# Patient Record
Sex: MALE | Race: BLACK OR AFRICAN AMERICAN | Employment: UNEMPLOYED | URBAN - METROPOLITAN AREA
[De-identification: names, ages, dates, MRNs, and addresses within clinical notes are randomized per-mention and may not be internally consistent; named-entity substitution may affect disease eponyms.]

---

## 2018-10-16 ENCOUNTER — APPOINTMENT (OUTPATIENT)
Dept: GENERAL RADIOLOGY | Age: 83
End: 2018-10-16
Payer: MEDICARE

## 2018-10-16 ENCOUNTER — HOSPITAL ENCOUNTER (EMERGENCY)
Age: 83
Discharge: HOME | End: 2018-10-17
Payer: MEDICARE

## 2018-10-16 VITALS
DIASTOLIC BLOOD PRESSURE: 116 MMHG | WEIGHT: 157.7 LBS | HEIGHT: 72 IN | BODY MASS INDEX: 21.36 KG/M2 | TEMPERATURE: 98.3 F | RESPIRATION RATE: 16 BRPM | SYSTOLIC BLOOD PRESSURE: 181 MMHG | OXYGEN SATURATION: 96 % | HEART RATE: 96 BPM

## 2018-10-16 DIAGNOSIS — S92.421A CLOSED FRACTURE OF DISTAL PHALANX OF RIGHT GREAT TOE, PHYSEAL INVOLVEMENT UNSPECIFIED, INITIAL ENCOUNTER: ICD-10-CM

## 2018-10-16 DIAGNOSIS — S91.111A LACERATION OF GREAT TOE OF RIGHT FOOT, FOREIGN BODY PRESENCE UNSPECIFIED, NAIL DAMAGE STATUS UNSPECIFIED, INITIAL ENCOUNTER: Primary | ICD-10-CM

## 2018-10-16 PROCEDURE — 6360000002 HC RX W HCPCS: Performed by: PHYSICIAN ASSISTANT

## 2018-10-16 PROCEDURE — 99282 EMERGENCY DEPT VISIT SF MDM: CPT

## 2018-10-16 PROCEDURE — 2500000003 HC RX 250 WO HCPCS: Performed by: PHYSICIAN ASSISTANT

## 2018-10-16 PROCEDURE — 90471 IMMUNIZATION ADMIN: CPT | Performed by: PHYSICIAN ASSISTANT

## 2018-10-16 PROCEDURE — 12001 RPR S/N/AX/GEN/TRNK 2.5CM/<: CPT

## 2018-10-16 PROCEDURE — 73630 X-RAY EXAM OF FOOT: CPT

## 2018-10-16 PROCEDURE — 6370000000 HC RX 637 (ALT 250 FOR IP): Performed by: PHYSICIAN ASSISTANT

## 2018-10-16 PROCEDURE — 90715 TDAP VACCINE 7 YRS/> IM: CPT | Performed by: PHYSICIAN ASSISTANT

## 2018-10-16 RX ORDER — LIDOCAINE HYDROCHLORIDE 10 MG/ML
5 INJECTION, SOLUTION INFILTRATION; PERINEURAL ONCE
Status: COMPLETED | OUTPATIENT
Start: 2018-10-16 | End: 2018-10-16

## 2018-10-16 RX ORDER — DIAPER,BRIEF,INFANT-TODD,DISP
EACH MISCELLANEOUS ONCE
Status: COMPLETED | OUTPATIENT
Start: 2018-10-16 | End: 2018-10-16

## 2018-10-16 RX ADMIN — TETANUS TOXOID, REDUCED DIPHTHERIA TOXOID AND ACELLULAR PERTUSSIS VACCINE, ADSORBED 0.5 ML: 5; 2.5; 8; 8; 2.5 SUSPENSION INTRAMUSCULAR at 22:15

## 2018-10-16 RX ADMIN — Medication: at 23:03

## 2018-10-16 RX ADMIN — LIDOCAINE HYDROCHLORIDE 5 ML: 10 INJECTION, SOLUTION INFILTRATION; PERINEURAL at 23:31

## 2018-10-16 ASSESSMENT — PAIN SCALES - GENERAL: PAINLEVEL_OUTOF10: 3

## 2018-10-17 RX ORDER — CEPHALEXIN 500 MG/1
500 CAPSULE ORAL 4 TIMES DAILY
Qty: 40 CAPSULE | Refills: 0 | Status: SHIPPED | OUTPATIENT
Start: 2018-10-17 | End: 2018-10-27

## 2018-10-17 NOTE — ED PROVIDER NOTES
orthopedic resident Dr. Celso Clinton will evaluate patient here in the Er.   recommendation for Keflex following up with Orthopedics in 2 weeks     Medical Decision Making:    Patient  discharged to home in a postop keep wound clean and dry he was discharged with Keflex is to  Orthopedics in 2 weeks. Counseling: The emergency provider has spoken with the patient and discussed todays results, in addition to providing specific details for the plan of care and counseling regarding the diagnosis and prognosis. Questions are answered at this time and they are agreeable with the plan.      --------------------------------- IMPRESSION AND DISPOSITION ---------------------------------    IMPRESSION  1. Laceration of great toe of right foot, foreign body presence unspecified, nail damage status unspecified, initial encounter    2. Closed fracture of distal phalanx of right great toe, physeal involvement unspecified, initial encounter        DISPOSITION  Disposition: Discharge to home  Patient condition is good      NOTE: This report was transcribed using voice recognition software.  Every effort was made to ensure accuracy; however, inadvertent computerized transcription errors may be present          Linnette Pina, [id number]  10/17/18 0031

## 2018-10-17 NOTE — CONSULTS
Transverse, nondisplaced fracture through proximal aspect of distal great toe phalanx. Appear not to be intraarticular. IMPRESSION:  · Left closed distal phalanx fracture of great toe  · Laceration of L great toe    PLAN:  · Distal laceration cleaned and probed. Unable to appreciate communication to bone. · Lacerations covered with xeroform, sterile dressings and hue tapped to 2nd toe.   · Pt placed in hard soled shoe  · WBAT to LLE  · Pain per ED  · OK to follow up with Dr. Alissa Healy in clinic

## 2018-11-03 ENCOUNTER — HOSPITAL ENCOUNTER (INPATIENT)
Age: 83
LOS: 6 days | Discharge: NURSING, REHAB OR LONG-TERM CARE | DRG: 065 | End: 2018-11-09
Attending: EMERGENCY MEDICINE | Admitting: INTERNAL MEDICINE
Payer: MEDICARE

## 2018-11-03 ENCOUNTER — APPOINTMENT (OUTPATIENT)
Dept: GENERAL RADIOLOGY | Age: 83
DRG: 065 | End: 2018-11-03
Payer: MEDICARE

## 2018-11-03 ENCOUNTER — APPOINTMENT (OUTPATIENT)
Dept: CT IMAGING | Age: 83
DRG: 065 | End: 2018-11-03
Payer: MEDICARE

## 2018-11-03 DIAGNOSIS — N17.9 AKI (ACUTE KIDNEY INJURY) (HCC): ICD-10-CM

## 2018-11-03 DIAGNOSIS — I62.9 INTRACRANIAL BLEED (HCC): Primary | ICD-10-CM

## 2018-11-03 DIAGNOSIS — I61.9 HEMORRHAGIC STROKE (HCC): ICD-10-CM

## 2018-11-03 DIAGNOSIS — T79.6XXA TRAUMATIC RHABDOMYOLYSIS, INITIAL ENCOUNTER (HCC): ICD-10-CM

## 2018-11-03 DIAGNOSIS — I10 HYPERTENSION, UNSPECIFIED TYPE: ICD-10-CM

## 2018-11-03 DIAGNOSIS — J18.9 COMMUNITY ACQUIRED PNEUMONIA OF LEFT LUNG, UNSPECIFIED PART OF LUNG: ICD-10-CM

## 2018-11-03 PROBLEM — M62.82 RHABDOMYOLYSIS: Status: ACTIVE | Noted: 2018-11-03

## 2018-11-03 PROBLEM — R33.9 URINARY RETENTION: Status: ACTIVE | Noted: 2018-11-03

## 2018-11-03 LAB
ABO/RH: NORMAL
ALBUMIN SERPL-MCNC: 3.6 G/DL (ref 3.5–5.2)
ALP BLD-CCNC: 79 U/L (ref 40–129)
ALT SERPL-CCNC: 35 U/L (ref 0–40)
AMORPHOUS: ABNORMAL
ANION GAP SERPL CALCULATED.3IONS-SCNC: 15 MMOL/L (ref 7–16)
ANISOCYTOSIS: ABNORMAL
ANTIBODY SCREEN: NORMAL
APTT: 26.9 SEC (ref 24.5–35.1)
AST SERPL-CCNC: 126 U/L (ref 0–39)
BACTERIA: ABNORMAL /HPF
BASOPHILS ABSOLUTE: 0.02 E9/L (ref 0–0.2)
BASOPHILS RELATIVE PERCENT: 0.1 % (ref 0–2)
BILIRUB SERPL-MCNC: 1.7 MG/DL (ref 0–1.2)
BILIRUBIN URINE: NEGATIVE
BLOOD, URINE: ABNORMAL
BUN BLDV-MCNC: 38 MG/DL (ref 8–23)
BURR CELLS: ABNORMAL
CALCIUM SERPL-MCNC: 10.7 MG/DL (ref 8.6–10.2)
CHLORIDE BLD-SCNC: 107 MMOL/L (ref 98–107)
CHLORIDE URINE RANDOM: 44 MMOL/L
CLARITY: CLEAR
CO2: 20 MMOL/L (ref 22–29)
COLOR: YELLOW
CREAT SERPL-MCNC: 2 MG/DL (ref 0.7–1.2)
CREATININE URINE: 76 MG/DL (ref 40–278)
EKG ATRIAL RATE: 92 BPM
EKG P AXIS: 73 DEGREES
EKG P-R INTERVAL: 172 MS
EKG Q-T INTERVAL: 382 MS
EKG QRS DURATION: 88 MS
EKG QTC CALCULATION (BAZETT): 472 MS
EKG R AXIS: 51 DEGREES
EKG T AXIS: 57 DEGREES
EKG VENTRICULAR RATE: 92 BPM
EOSINOPHILS ABSOLUTE: 0 E9/L (ref 0.05–0.5)
EOSINOPHILS RELATIVE PERCENT: 0 % (ref 0–6)
GFR AFRICAN AMERICAN: 38
GFR NON-AFRICAN AMERICAN: 38 ML/MIN/1.73
GLUCOSE BLD-MCNC: 147 MG/DL (ref 74–109)
GLUCOSE URINE: NEGATIVE MG/DL
HCT VFR BLD CALC: 46.2 % (ref 37–54)
HEMOGLOBIN: 14.6 G/DL (ref 12.5–16.5)
IMMATURE GRANULOCYTES #: 0.14 E9/L
IMMATURE GRANULOCYTES %: 0.8 % (ref 0–5)
INR BLD: 1.1
KETONES, URINE: NEGATIVE MG/DL
LACTIC ACID: 2.5 MMOL/L (ref 0.5–2.2)
LEUKOCYTE ESTERASE, URINE: NEGATIVE
LYMPHOCYTES ABSOLUTE: 0.59 E9/L (ref 1.5–4)
LYMPHOCYTES RELATIVE PERCENT: 3.5 % (ref 20–42)
MCH RBC QN AUTO: 27.4 PG (ref 26–35)
MCHC RBC AUTO-ENTMCNC: 31.6 % (ref 32–34.5)
MCV RBC AUTO: 86.8 FL (ref 80–99.9)
MONOCYTES ABSOLUTE: 2.19 E9/L (ref 0.1–0.95)
MONOCYTES RELATIVE PERCENT: 13.1 % (ref 2–12)
NEUTROPHILS ABSOLUTE: 13.74 E9/L (ref 1.8–7.3)
NEUTROPHILS RELATIVE PERCENT: 82.5 % (ref 43–80)
NITRITE, URINE: NEGATIVE
OSMOLALITY URINE: 519 MOSM/KG (ref 300–900)
OVALOCYTES: ABNORMAL
PDW BLD-RTO: 15 FL (ref 11.5–15)
PH UA: 6 (ref 5–9)
PLATELET # BLD: 116 E9/L (ref 130–450)
PMV BLD AUTO: 13.6 FL (ref 7–12)
POIKILOCYTES: ABNORMAL
POLYCHROMASIA: ABNORMAL
POTASSIUM SERPL-SCNC: 4.1 MMOL/L (ref 3.5–5)
POTASSIUM, UR: 30.5 MMOL/L
PROTEIN UA: 30 MG/DL
PROTHROMBIN TIME: 12.6 SEC (ref 9.3–12.4)
RBC # BLD: 5.32 E12/L (ref 3.8–5.8)
RBC UA: ABNORMAL /HPF (ref 0–2)
SODIUM BLD-SCNC: 142 MMOL/L (ref 132–146)
SODIUM URINE: 47 MMOL/L
SPECIFIC GRAVITY UA: 1.02 (ref 1–1.03)
TARGET CELLS: ABNORMAL
TOTAL CK: 2401 U/L (ref 20–200)
TOTAL CK: 4138 U/L (ref 20–200)
TOTAL PROTEIN: 7.5 G/DL (ref 6.4–8.3)
TROPONIN: 0.02 NG/ML (ref 0–0.03)
TSH SERPL DL<=0.05 MIU/L-ACNC: 1.95 UIU/ML (ref 0.27–4.2)
UROBILINOGEN, URINE: 0.2 E.U./DL
WBC # BLD: 16.7 E9/L (ref 4.5–11.5)
WBC UA: ABNORMAL /HPF (ref 0–5)

## 2018-11-03 PROCEDURE — 99285 EMERGENCY DEPT VISIT HI MDM: CPT

## 2018-11-03 PROCEDURE — 93005 ELECTROCARDIOGRAM TRACING: CPT | Performed by: EMERGENCY MEDICINE

## 2018-11-03 PROCEDURE — 96365 THER/PROPH/DIAG IV INF INIT: CPT

## 2018-11-03 PROCEDURE — 83935 ASSAY OF URINE OSMOLALITY: CPT

## 2018-11-03 PROCEDURE — 02HV33Z INSERTION OF INFUSION DEVICE INTO SUPERIOR VENA CAVA, PERCUTANEOUS APPROACH: ICD-10-PCS | Performed by: INTERNAL MEDICINE

## 2018-11-03 PROCEDURE — 6360000002 HC RX W HCPCS: Performed by: EMERGENCY MEDICINE

## 2018-11-03 PROCEDURE — 85025 COMPLETE CBC W/AUTO DIFF WBC: CPT

## 2018-11-03 PROCEDURE — 80053 COMPREHEN METABOLIC PANEL: CPT

## 2018-11-03 PROCEDURE — 2500000003 HC RX 250 WO HCPCS: Performed by: INTERNAL MEDICINE

## 2018-11-03 PROCEDURE — 84300 ASSAY OF URINE SODIUM: CPT

## 2018-11-03 PROCEDURE — 2580000003 HC RX 258: Performed by: EMERGENCY MEDICINE

## 2018-11-03 PROCEDURE — 87088 URINE BACTERIA CULTURE: CPT

## 2018-11-03 PROCEDURE — 86850 RBC ANTIBODY SCREEN: CPT

## 2018-11-03 PROCEDURE — 82570 ASSAY OF URINE CREATININE: CPT

## 2018-11-03 PROCEDURE — 87081 CULTURE SCREEN ONLY: CPT

## 2018-11-03 PROCEDURE — 36556 INSERT NON-TUNNEL CV CATH: CPT | Performed by: SURGERY

## 2018-11-03 PROCEDURE — 86901 BLOOD TYPING SEROLOGIC RH(D): CPT

## 2018-11-03 PROCEDURE — APPSS180 APP SPLIT SHARED TIME > 60 MINUTES: Performed by: NURSE PRACTITIONER

## 2018-11-03 PROCEDURE — 36415 COLL VENOUS BLD VENIPUNCTURE: CPT

## 2018-11-03 PROCEDURE — 6360000002 HC RX W HCPCS: Performed by: INTERNAL MEDICINE

## 2018-11-03 PROCEDURE — 70450 CT HEAD/BRAIN W/O DYE: CPT

## 2018-11-03 PROCEDURE — 72131 CT LUMBAR SPINE W/O DYE: CPT

## 2018-11-03 PROCEDURE — 87205 SMEAR GRAM STAIN: CPT

## 2018-11-03 PROCEDURE — 83605 ASSAY OF LACTIC ACID: CPT

## 2018-11-03 PROCEDURE — 74176 CT ABD & PELVIS W/O CONTRAST: CPT

## 2018-11-03 PROCEDURE — 84133 ASSAY OF URINE POTASSIUM: CPT

## 2018-11-03 PROCEDURE — 85610 PROTHROMBIN TIME: CPT

## 2018-11-03 PROCEDURE — 99223 1ST HOSP IP/OBS HIGH 75: CPT | Performed by: SURGERY

## 2018-11-03 PROCEDURE — 72170 X-RAY EXAM OF PELVIS: CPT

## 2018-11-03 PROCEDURE — 72125 CT NECK SPINE W/O DYE: CPT

## 2018-11-03 PROCEDURE — 71045 X-RAY EXAM CHEST 1 VIEW: CPT

## 2018-11-03 PROCEDURE — 2580000003 HC RX 258: Performed by: INTERNAL MEDICINE

## 2018-11-03 PROCEDURE — 71250 CT THORAX DX C-: CPT

## 2018-11-03 PROCEDURE — 81001 URINALYSIS AUTO W/SCOPE: CPT

## 2018-11-03 PROCEDURE — 96375 TX/PRO/DX INJ NEW DRUG ADDON: CPT

## 2018-11-03 PROCEDURE — 2000000000 HC ICU R&B

## 2018-11-03 PROCEDURE — 84443 ASSAY THYROID STIM HORMONE: CPT

## 2018-11-03 PROCEDURE — 86900 BLOOD TYPING SEROLOGIC ABO: CPT

## 2018-11-03 PROCEDURE — 87040 BLOOD CULTURE FOR BACTERIA: CPT

## 2018-11-03 PROCEDURE — 84484 ASSAY OF TROPONIN QUANT: CPT

## 2018-11-03 PROCEDURE — 82550 ASSAY OF CK (CPK): CPT

## 2018-11-03 PROCEDURE — 85730 THROMBOPLASTIN TIME PARTIAL: CPT

## 2018-11-03 PROCEDURE — 82436 ASSAY OF URINE CHLORIDE: CPT

## 2018-11-03 PROCEDURE — 2500000003 HC RX 250 WO HCPCS: Performed by: EMERGENCY MEDICINE

## 2018-11-03 PROCEDURE — 51702 INSERT TEMP BLADDER CATH: CPT

## 2018-11-03 PROCEDURE — 72128 CT CHEST SPINE W/O DYE: CPT

## 2018-11-03 RX ORDER — SODIUM CHLORIDE 0.9 % (FLUSH) 0.9 %
10 SYRINGE (ML) INJECTION EVERY 12 HOURS SCHEDULED
Status: DISCONTINUED | OUTPATIENT
Start: 2018-11-03 | End: 2018-11-09 | Stop reason: HOSPADM

## 2018-11-03 RX ORDER — LABETALOL HYDROCHLORIDE 5 MG/ML
10 INJECTION, SOLUTION INTRAVENOUS EVERY 10 MIN PRN
Status: DISCONTINUED | OUTPATIENT
Start: 2018-11-03 | End: 2018-11-05

## 2018-11-03 RX ORDER — SODIUM CHLORIDE 9 MG/ML
INJECTION, SOLUTION INTRAVENOUS CONTINUOUS
Status: DISCONTINUED | OUTPATIENT
Start: 2018-11-03 | End: 2018-11-03

## 2018-11-03 RX ORDER — LOVASTATIN 40 MG/1
40 TABLET ORAL NIGHTLY
COMMUNITY

## 2018-11-03 RX ORDER — SODIUM CHLORIDE 0.9 % (FLUSH) 0.9 %
10 SYRINGE (ML) INJECTION PRN
Status: DISCONTINUED | OUTPATIENT
Start: 2018-11-03 | End: 2018-11-09 | Stop reason: HOSPADM

## 2018-11-03 RX ORDER — 0.9 % SODIUM CHLORIDE 0.9 %
1000 INTRAVENOUS SOLUTION INTRAVENOUS ONCE
Status: COMPLETED | OUTPATIENT
Start: 2018-11-03 | End: 2018-11-03

## 2018-11-03 RX ORDER — ACETAMINOPHEN 325 MG/1
650 TABLET ORAL EVERY 4 HOURS PRN
Status: DISCONTINUED | OUTPATIENT
Start: 2018-11-03 | End: 2018-11-09 | Stop reason: HOSPADM

## 2018-11-03 RX ORDER — LISINOPRIL 20 MG/1
20 TABLET ORAL DAILY
COMMUNITY

## 2018-11-03 RX ORDER — ACETAMINOPHEN 325 MG/1
650 TABLET ORAL EVERY 4 HOURS PRN
Status: DISCONTINUED | OUTPATIENT
Start: 2018-11-03 | End: 2018-11-03 | Stop reason: SDUPTHER

## 2018-11-03 RX ORDER — DEXAMETHASONE SODIUM PHOSPHATE 10 MG/ML
10 INJECTION, SOLUTION INTRAMUSCULAR; INTRAVENOUS ONCE
Status: COMPLETED | OUTPATIENT
Start: 2018-11-03 | End: 2018-11-03

## 2018-11-03 RX ORDER — LORAZEPAM 2 MG/ML
0.5 INJECTION INTRAMUSCULAR ONCE
Status: COMPLETED | OUTPATIENT
Start: 2018-11-03 | End: 2018-11-03

## 2018-11-03 RX ORDER — DONEPEZIL HYDROCHLORIDE 10 MG/1
10 TABLET, FILM COATED ORAL NIGHTLY
COMMUNITY

## 2018-11-03 RX ORDER — HYDRALAZINE HYDROCHLORIDE 20 MG/ML
10 INJECTION INTRAMUSCULAR; INTRAVENOUS EVERY 10 MIN PRN
Status: DISCONTINUED | OUTPATIENT
Start: 2018-11-03 | End: 2018-11-05

## 2018-11-03 RX ORDER — MORPHINE SULFATE 2 MG/ML
2 INJECTION, SOLUTION INTRAMUSCULAR; INTRAVENOUS
Status: DISCONTINUED | OUTPATIENT
Start: 2018-11-03 | End: 2018-11-05

## 2018-11-03 RX ORDER — HYDRALAZINE HYDROCHLORIDE 20 MG/ML
10 INJECTION INTRAMUSCULAR; INTRAVENOUS ONCE
Status: COMPLETED | OUTPATIENT
Start: 2018-11-03 | End: 2018-11-03

## 2018-11-03 RX ORDER — ACETAMINOPHEN 650 MG/1
650 SUPPOSITORY RECTAL EVERY 4 HOURS PRN
Status: DISCONTINUED | OUTPATIENT
Start: 2018-11-03 | End: 2018-11-09 | Stop reason: HOSPADM

## 2018-11-03 RX ORDER — 0.9 % SODIUM CHLORIDE 0.9 %
500 INTRAVENOUS SOLUTION INTRAVENOUS ONCE
Status: COMPLETED | OUTPATIENT
Start: 2018-11-03 | End: 2018-11-03

## 2018-11-03 RX ORDER — TAMSULOSIN HYDROCHLORIDE 0.4 MG/1
0.4 CAPSULE ORAL NIGHTLY
COMMUNITY

## 2018-11-03 RX ORDER — ONDANSETRON 2 MG/ML
4 INJECTION INTRAMUSCULAR; INTRAVENOUS EVERY 6 HOURS PRN
Status: DISCONTINUED | OUTPATIENT
Start: 2018-11-03 | End: 2018-11-09 | Stop reason: HOSPADM

## 2018-11-03 RX ORDER — MORPHINE SULFATE 2 MG/ML
1 INJECTION, SOLUTION INTRAMUSCULAR; INTRAVENOUS
Status: DISCONTINUED | OUTPATIENT
Start: 2018-11-03 | End: 2018-11-05

## 2018-11-03 RX ORDER — LEVETIRACETAM 10 MG/ML
1000 INJECTION INTRAVASCULAR ONCE
Status: COMPLETED | OUTPATIENT
Start: 2018-11-03 | End: 2018-11-03

## 2018-11-03 RX ORDER — ERGOCALCIFEROL (VITAMIN D2) 1250 MCG
50000 CAPSULE ORAL WEEKLY
Status: ON HOLD | COMMUNITY
End: 2018-11-09 | Stop reason: HOSPADM

## 2018-11-03 RX ADMIN — DEXAMETHASONE SODIUM PHOSPHATE 10 MG: 10 INJECTION INTRAMUSCULAR; INTRAVENOUS at 13:39

## 2018-11-03 RX ADMIN — LEVETIRACETAM 250 MG: 100 INJECTION, SOLUTION INTRAVENOUS at 20:38

## 2018-11-03 RX ADMIN — LEVETIRACETAM 1000 MG: 10 INJECTION, SOLUTION INTRAVENOUS at 14:08

## 2018-11-03 RX ADMIN — SODIUM BICARBONATE: 84 INJECTION, SOLUTION INTRAVENOUS at 19:59

## 2018-11-03 RX ADMIN — DEXTROSE MONOHYDRATE 1 G: 5 INJECTION, SOLUTION INTRAVENOUS at 15:09

## 2018-11-03 RX ADMIN — SODIUM CHLORIDE 500 ML: 9 INJECTION, SOLUTION INTRAVENOUS at 15:10

## 2018-11-03 RX ADMIN — LORAZEPAM 0.5 MG: 2 INJECTION INTRAMUSCULAR; INTRAVENOUS at 13:24

## 2018-11-03 RX ADMIN — DOXYCYCLINE 100 MG: 100 INJECTION, POWDER, LYOPHILIZED, FOR SOLUTION INTRAVENOUS at 16:02

## 2018-11-03 RX ADMIN — Medication 10 ML: at 20:10

## 2018-11-03 RX ADMIN — SODIUM CHLORIDE 1000 ML: 9 INJECTION, SOLUTION INTRAVENOUS at 12:22

## 2018-11-03 RX ADMIN — AZITHROMYCIN MONOHYDRATE 500 MG: 500 INJECTION, POWDER, LYOPHILIZED, FOR SOLUTION INTRAVENOUS at 18:45

## 2018-11-03 RX ADMIN — HYDRALAZINE HYDROCHLORIDE 10 MG: 20 INJECTION INTRAMUSCULAR; INTRAVENOUS at 12:22

## 2018-11-03 NOTE — ED PROVIDER NOTES
Neurological: He is alert. He exhibits normal muscle tone. Alert but oriented to self only; able to follow commands; difficult to understand but when I do understand, he is not making sense   Skin: Skin is warm and dry. No rash noted. He is not diaphoretic. No erythema. No pallor. Procedures    MDM  Number of Diagnoses or Management Options  DANNIE (acute kidney injury) [facility]):   Community acquired pneumonia of left lung, unspecified part of lung:   Hypertension, unspecified type: Intracranial bleed [facility]):   Traumatic rhabdomyolysis, initial encounter [facility]):   Diagnosis management comments: Labs and imaging ordered and reviewed. He seemed to be in rhabdomyolysis with elevated CK and slight kidney injury. CT scan of his head showing intracranial bleed. He was found by the steps and this was traumatic, so trauma was consult. He was hypertensive but did improve with a couple doses of hydralazine. His chest x-ray showing possible pneumonia at the left lower lung base. He was hypothermic and had leukocytosis so he was given Rocephin and doxycycline to cover for community acquired infection. He was given some IV fluids. A Burgos catheter was placed. He was given a gram of Keppra because there was evidence of subarachnoid bleed. He was also given Decadron as there was some edema around his central vertical bleed on the left. He is not on any anticoagulation. ED Course as of Nov 03 1527   Sat Nov 03, 2018   1216 Bedside ultrasound showing distended bladder. Burgos catheter was placed with ease and there is clear to dark yellow urine and return. Labs are being drawn. Orders have been initiated. [EM]   1325 Blood pressures now 151/84. We will give him another dose of hydralazine. CT head showing either intraparenchymal bleed on the left with edema versus mass with edema. No midline shift appreciated. Son updated.  He was told that this is very serious but he was adamant that he would be okay with surgery if needed Medications   cefTRIAXone (ROCEPHIN) 1 g in dextrose 5 % 50 mL IVPB (vial-mate) (1 g Intravenous New Bag 11/3/18 1509)   doxycycline (VIBRAMYCIN) 100 mg in dextrose 5 % 100 mL IVPB (not administered)   0.9 % sodium chloride bolus (500 mLs Intravenous New Bag 11/3/18 1510)   0.9 % sodium chloride bolus (0 mLs Intravenous Stopped 11/3/18 1442)   hydrALAZINE (APRESOLINE) injection 10 mg (10 mg Intravenous Given 11/3/18 1222)   LORazepam (ATIVAN) injection 0.5 mg (0.5 mg Intravenous Given 11/3/18 1324)   dexamethasone (PF) (DECADRON) injection 10 mg (10 mg Intravenous Given 11/3/18 1339)   levetiracetam (KEPPRA) 1000 mg/100 mL IVPB (0 mg Intravenous Stopped 11/3/18 1442)       CONSULTATIONS:            1330 - Discussed case with [facility] Dr. Aly Dickinson who advised to call trauma. 1520 -  Discussed case with Dr. Jude Shafer who agreed to accept admission. COUNSELING:   I have spoken with the son and patient and discussed todays results, in addition to providing specific details for the plan of care and counseling regarding the diagnosis and prognosis.     --------------------------------------- IMPRESSION & DISPOSITION --------------------------------     IMPRESSION(s):  1. Intracranial bleed ([facility] [address]    2. Community acquired pneumonia of left lung, unspecified part of lung    3. Traumatic rhabdomyolysis, initial encounter ([facility] [address]    4. DANNIE (acute kidney injury) ([facility] [address]    5. Hypertension, unspecified type        This patient's ED course included: a personal history and physicial examination, re-evaluation prior to disposition, IV medications, cardiac monitoring and continuous pulse oximetry    This patient has remained hemodynamically stable during their ED course. DISPOSITION:  Disposition: Admit to NICU. Patient condition is critical.    END OF PROVIDER NOTE.            Juliet Reeves DO  Resident  11/03/18 7411

## 2018-11-03 NOTE — ED NOTES
Bed: 05  Expected date:   Expected time:   Means of arrival:   Comments:  [facility]     Cami Shaw RN  11/03/18 1149

## 2018-11-03 NOTE — PROGRESS NOTES
History     Social History    Marital status:      Spouse name: N/A    Number of children: N/A    Years of education: N/A     Occupational History    Not on file. Social History Main Topics    Smoking status: Never Smoker    Smokeless tobacco: Never Used    Alcohol use No    Drug use: No    Sexual activity: Not on file     Other Topics Concern    Not on file     Social History Narrative    No narrative on file     No past surgical history on file. Patient has no known allergies. Data:   Scheduled Meds:   sodium chloride flush  10 mL Intravenous 2 times per day    levetiracetam  250 mg Intravenous Q12H    [START ON 11/4/2018] cefTRIAXone (ROCEPHIN) IV  1 g Intravenous Q24H    azithromycin  500 mg Intravenous Q24H     Continuous Infusions:   sodium chloride       PRN Meds:sodium chloride flush, magnesium hydroxide, ondansetron, morphine **OR** morphine, acetaminophen, acetaminophen, hydrALAZINE, labetalol  I/O last 3 completed shifts: In: 1100 [IV Piggyback:1100]  Out: -   I/O this shift:  In: -   Out: 1400 [Urine:1400]    Intake/Output Summary (Last 24 hours) at 11/03/18 1833  Last data filed at 11/03/18 1610   Gross per 24 hour   Intake             1100 ml   Output             1400 ml   Net             -300 ml     CBC:   Recent Labs      11/03/18   1220   WBC  16.7*   HGB  14.6   PLT  116*     BMP:    Recent Labs      11/03/18   1220   NA  142   K  4.1   CL  107   CO2  20*   BUN  38*   CREATININE  2.0*   GLUCOSE  147*     Hepatic:   Recent Labs      11/03/18   1220   AST  126*   ALT  35   BILITOT  1.7*   ALKPHOS  79     Protein/ Albumin:    Lab Results   Component Value Date    LABALBU 3.6 11/03/2018      Ref.  Range 11/3/2018 12:20   Color, UA Latest Ref Range: Straw/Yellow  Yellow   Clarity, UA Latest Ref Range: Clear  Clear   Glucose, UA Latest Ref Range: Negative mg/dL Negative   Bilirubin, Urine Latest Ref Range: Negative  Negative   Ketones, Urine Latest Ref Range: Negative mg/dL Negative   Specific Gravity, UA Latest Ref Range: 1.005 - 1.030  1.020   Blood, Urine Latest Ref Range: Negative  LARGE (A)   pH, UA Latest Ref Range: 5.0 - 9.0  6.0   Protein, UA Latest Ref Range: Negative mg/dL 30 (A)   Urobilinogen, Urine Latest Ref Range: <2.0 E.U./dL 0.2   Nitrite, Urine Latest Ref Range: Negative  Negative   Leukocyte Esterase, Urine Latest Ref Range: Negative  Negative   WBC, UA Latest Ref Range: 0 - 5 /HPF 1-3   RBC, UA Latest Ref Range: 0 - 2 /HPF 5-10 (A)   Bacteria, UA Latest Units: /HPF FEW (A)   Amorphous, UA Unknown FEW      Ref. Range 11/3/2018 12:20   Total CK Latest Ref Range: 20 - 200 U/L 4,138 (H)        Imaging Results         Procedure Component Value Ref Range Date/Time     CT ABDOMEN PELVIS WO CONTRAST Additional Contrast? None [[id number]] Resulted: 18 1639     Order Status: Completed Updated: 18 164     Narrative:       Patient MRN:  [id number]  : 3/29/1931  Age: 80 years  Gender: Male  Order Date:  11/3/2018 2:30 PM  EXAM: CT ABDOMEN PELVIS WO CONTRAST  NUMBER OF IMAGES \ views:  115  INDICATION:  Trauma   COMPARISON: None    TECHNIQUE: Axial computerized tomography sections of the abdomen and  pelvis with sagittal and coronal MPR reconstructions were obtained  from the top of the diaphragm to the pelvis. Low-dose CT  acquisition technique included one of following options;  1 . Automated exposure control, 2. Adjustment of MA and or KV  according to patient's size or 3. Use of iterative reconstruction. FINDINGS:  Evaluation of the solid organs and vasculature are limited without the  use of intravenous contrast. Evaluation of the gastrointestinal tract  is limited without the use of oral contrast.  THORACIC BASE: Please refer to the report for the CT of the chest that  was performed concurrently with this exam.  LIVER: Unremarkable. BILIARY: The gallbladder is present. PANCREAS: Unremarkable. SPLEEN: Unremarkable.   ADRENALS:  Unremarkable. KIDNEYS:  A large exophytic cyst is seen arising from the upper pole  of the right kidney. No hydronephrosis is seen. A Burgos catheter is  present within the decompressed bladder. The prostate is enlarged,  measuring 5.1 cm transversely. GI: No evidence of free air or bowel obstruction. The appendix is not  visualized. Scattered colonic diverticuli are seen without evidence of  diverticulitis. PELVIS: Unremarkable. MSK: No acute osseous findings. OTHER: None.     Impression:         1. No acute traumatic intra-abdominal findings within the given  confines of this noncontrast exam.  2. Enlarged prostate. 3. Colonic diverticulosis without evidence of diverticulitis.             CT Lumbar Spine WO Contrast [[id number]] Resulted: 18     Order Status: Completed Updated: 18     Narrative:       Patient MRN:  [id number]  : 3/29/1931  Age: 80 years  Gender: Male  Order Date:  11/3/2018 2:30 PM  EXAM: CT THORACIC SPINE WO CONTRAST, CT LUMBAR SPINE WO CONTRAST  NUMBER OF IMAGES:  [id number]  INDICATION:  trauma  , pain  COMPARISON: None    TECHNIQUE: Axial CT of the thoracic and lumbar spine was obtained. Sagittal and coronal MPR reconstructions were performed and uploaded  to PACS for evaluation. Technique: Low-dose CT  acquisition technique included one of  following options; 1 . Automated exposure control, 2. Adjustment of MA  and or KV according to patient's size or 3. Use of iterative  reconstruction. FINDINGS:  No acute fracture or dislocation is seen. No significant degenerative changes are seen. Alignment of the  thoracic and lumbar spine is anatomic. Significant loss of intervertebral disc height is seen at L2-L3 and  L5-S1.  Severe facet joint arthropathy seen throughout the lumbar  spine.     Impression:       No acute osseous findings.     CT Thoracic Spine WO Contrast [[id number]] Resulted: 18     Order Status: Completed Updated: 18     Narrative:

## 2018-11-04 ENCOUNTER — APPOINTMENT (OUTPATIENT)
Dept: CT IMAGING | Age: 83
DRG: 065 | End: 2018-11-04
Payer: MEDICARE

## 2018-11-04 ENCOUNTER — APPOINTMENT (OUTPATIENT)
Dept: GENERAL RADIOLOGY | Age: 83
DRG: 065 | End: 2018-11-04
Payer: MEDICARE

## 2018-11-04 LAB
ALBUMIN SERPL-MCNC: 3.2 G/DL (ref 3.5–5.2)
ALP BLD-CCNC: 69 U/L (ref 40–129)
ALT SERPL-CCNC: 29 U/L (ref 0–40)
ANION GAP SERPL CALCULATED.3IONS-SCNC: 9 MMOL/L (ref 7–16)
ANISOCYTOSIS: ABNORMAL
AST SERPL-CCNC: 79 U/L (ref 0–39)
BASOPHILS ABSOLUTE: 0.01 E9/L (ref 0–0.2)
BASOPHILS RELATIVE PERCENT: 0.1 % (ref 0–2)
BILIRUB SERPL-MCNC: 1.1 MG/DL (ref 0–1.2)
BUN BLDV-MCNC: 24 MG/DL (ref 8–23)
CALCIUM IONIZED: 1.45 MMOL/L (ref 1.15–1.33)
CALCIUM SERPL-MCNC: 9.7 MG/DL (ref 8.6–10.2)
CHLORIDE BLD-SCNC: 109 MMOL/L (ref 98–107)
CO2: 27 MMOL/L (ref 22–29)
CREAT SERPL-MCNC: 1.1 MG/DL (ref 0.7–1.2)
EOSINOPHIL, URINE: 0 % (ref 0–1)
EOSINOPHILS ABSOLUTE: 0 E9/L (ref 0.05–0.5)
EOSINOPHILS RELATIVE PERCENT: 0 % (ref 0–6)
GFR AFRICAN AMERICAN: >60
GFR NON-AFRICAN AMERICAN: >60 ML/MIN/1.73
GLUCOSE BLD-MCNC: 151 MG/DL (ref 74–109)
HCT VFR BLD CALC: 41.9 % (ref 37–54)
HEMOGLOBIN: 13.4 G/DL (ref 12.5–16.5)
IMMATURE GRANULOCYTES #: 0.08 E9/L
IMMATURE GRANULOCYTES %: 0.7 % (ref 0–5)
LACTIC ACID: 0.9 MMOL/L (ref 0.5–2.2)
LYMPHOCYTES ABSOLUTE: 0.73 E9/L (ref 1.5–4)
LYMPHOCYTES RELATIVE PERCENT: 6.2 % (ref 20–42)
MAGNESIUM: 2.2 MG/DL (ref 1.6–2.6)
MCH RBC QN AUTO: 28 PG (ref 26–35)
MCHC RBC AUTO-ENTMCNC: 32 % (ref 32–34.5)
MCV RBC AUTO: 87.7 FL (ref 80–99.9)
MONOCYTES ABSOLUTE: 1.61 E9/L (ref 0.1–0.95)
MONOCYTES RELATIVE PERCENT: 13.7 % (ref 2–12)
NEUTROPHILS ABSOLUTE: 9.34 E9/L (ref 1.8–7.3)
NEUTROPHILS RELATIVE PERCENT: 79.3 % (ref 43–80)
PARATHYROID HORMONE INTACT: 185 PG/ML (ref 15–65)
PDW BLD-RTO: 15.2 FL (ref 11.5–15)
PHOSPHORUS: 2.2 MG/DL (ref 2.5–4.5)
PLATELET # BLD: 112 E9/L (ref 130–450)
PMV BLD AUTO: 13.2 FL (ref 7–12)
POTASSIUM SERPL-SCNC: 3.9 MMOL/L (ref 3.5–5)
RBC # BLD: 4.78 E12/L (ref 3.8–5.8)
SODIUM BLD-SCNC: 145 MMOL/L (ref 132–146)
TOTAL CK: 1342 U/L (ref 20–200)
TOTAL CK: 1589 U/L (ref 20–200)
TOTAL PROTEIN: 6.5 G/DL (ref 6.4–8.3)
WBC # BLD: 11.8 E9/L (ref 4.5–11.5)

## 2018-11-04 PROCEDURE — 99221 1ST HOSP IP/OBS SF/LOW 40: CPT | Performed by: EMERGENCY MEDICINE

## 2018-11-04 PROCEDURE — 82330 ASSAY OF CALCIUM: CPT

## 2018-11-04 PROCEDURE — 85025 COMPLETE CBC W/AUTO DIFF WBC: CPT

## 2018-11-04 PROCEDURE — 84100 ASSAY OF PHOSPHORUS: CPT

## 2018-11-04 PROCEDURE — 84165 PROTEIN E-PHORESIS SERUM: CPT

## 2018-11-04 PROCEDURE — 80053 COMPREHEN METABOLIC PANEL: CPT

## 2018-11-04 PROCEDURE — 2500000003 HC RX 250 WO HCPCS: Performed by: INTERNAL MEDICINE

## 2018-11-04 PROCEDURE — 6360000002 HC RX W HCPCS: Performed by: INTERNAL MEDICINE

## 2018-11-04 PROCEDURE — 2500000003 HC RX 250 WO HCPCS: Performed by: NURSE PRACTITIONER

## 2018-11-04 PROCEDURE — 83735 ASSAY OF MAGNESIUM: CPT

## 2018-11-04 PROCEDURE — 99291 CRITICAL CARE FIRST HOUR: CPT | Performed by: SURGERY

## 2018-11-04 PROCEDURE — 82550 ASSAY OF CK (CPK): CPT

## 2018-11-04 PROCEDURE — 36592 COLLECT BLOOD FROM PICC: CPT

## 2018-11-04 PROCEDURE — S0028 INJECTION, FAMOTIDINE, 20 MG: HCPCS | Performed by: INTERNAL MEDICINE

## 2018-11-04 PROCEDURE — 74018 RADEX ABDOMEN 1 VIEW: CPT

## 2018-11-04 PROCEDURE — 2580000003 HC RX 258: Performed by: INTERNAL MEDICINE

## 2018-11-04 PROCEDURE — 83970 ASSAY OF PARATHORMONE: CPT

## 2018-11-04 PROCEDURE — 70450 CT HEAD/BRAIN W/O DYE: CPT

## 2018-11-04 PROCEDURE — C1889 IMPLANT/INSERT DEVICE, NOC: HCPCS

## 2018-11-04 PROCEDURE — 83605 ASSAY OF LACTIC ACID: CPT

## 2018-11-04 PROCEDURE — 36415 COLL VENOUS BLD VENIPUNCTURE: CPT

## 2018-11-04 PROCEDURE — 2000000000 HC ICU R&B

## 2018-11-04 RX ORDER — LEVETIRACETAM 5 MG/ML
250 INJECTION INTRAVASCULAR EVERY 12 HOURS
Status: DISCONTINUED | OUTPATIENT
Start: 2018-11-04 | End: 2018-11-05

## 2018-11-04 RX ORDER — DEXTROSE AND SODIUM CHLORIDE 5; .45 G/100ML; G/100ML
INJECTION, SOLUTION INTRAVENOUS CONTINUOUS
Status: DISCONTINUED | OUTPATIENT
Start: 2018-11-04 | End: 2018-11-09 | Stop reason: HOSPADM

## 2018-11-04 RX ADMIN — LEVETIRACETAM 250 MG: 5 INJECTION INTRAVENOUS at 09:21

## 2018-11-04 RX ADMIN — LABETALOL HYDROCHLORIDE 10 MG: 5 INJECTION, SOLUTION INTRAVENOUS at 21:41

## 2018-11-04 RX ADMIN — AZITHROMYCIN MONOHYDRATE 500 MG: 500 INJECTION, POWDER, LYOPHILIZED, FOR SOLUTION INTRAVENOUS at 17:58

## 2018-11-04 RX ADMIN — DEXTROSE AND SODIUM CHLORIDE: 5; 450 INJECTION, SOLUTION INTRAVENOUS at 15:45

## 2018-11-04 RX ADMIN — Medication 10 ML: at 21:40

## 2018-11-04 RX ADMIN — CEFTRIAXONE SODIUM 1 G: 1 INJECTION, POWDER, FOR SOLUTION INTRAMUSCULAR; INTRAVENOUS at 15:51

## 2018-11-04 RX ADMIN — LEVETIRACETAM 250 MG: 5 INJECTION INTRAVENOUS at 20:30

## 2018-11-04 RX ADMIN — FAMOTIDINE 20 MG: 10 INJECTION, SOLUTION INTRAVENOUS at 21:40

## 2018-11-04 RX ADMIN — MORPHINE SULFATE 2 MG: 2 INJECTION, SOLUTION INTRAMUSCULAR; INTRAVENOUS at 21:41

## 2018-11-04 RX ADMIN — POTASSIUM PHOSPHATE, MONOBASIC AND POTASSIUM PHOSPHATE, DIBASIC 15 MMOL: 224; 236 INJECTION, SOLUTION INTRAVENOUS at 15:46

## 2018-11-04 RX ADMIN — Medication 10 ML: at 20:30

## 2018-11-04 RX ADMIN — SODIUM BICARBONATE: 84 INJECTION, SOLUTION INTRAVENOUS at 10:36

## 2018-11-04 RX ADMIN — SODIUM BICARBONATE: 84 INJECTION, SOLUTION INTRAVENOUS at 01:52

## 2018-11-04 RX ADMIN — Medication 10 ML: at 09:21

## 2018-11-04 NOTE — CONSULTS
floor nurse. Current Medications:  Inpatient medications reviewed: yes  Home Medications reviewed: yes    Subjective:   Hospital days prior to  consult: Hospital Day: 2    Subjective/Events  This 80-year-old -American male was found down by his son at home. The patient's son stated that the patient has underlying dementia and that he does check on him frequently. Nursing reported that the son was upset last evening because he was unable to check on him for a few days prior to his fall. The patient is unable to give any history at this time due to his altered mental status. He is on no anticoagulation. History was obtained from the patient's current medical record and nursing, due to the patient's altered mental status. On discussing code status with the son, the son stated that he would like the patient to be a limited code with no resuscitation if a cardiopulmonary arrest occurs. Goals of care: continue current management and to be determined  Advance Directives: limited code- no CPR, No Intubation, No Medications, No Cardioversion  Surrogate:Spouse  Prognosis: unknown  Spiritual assessment: No spiritual distress identified   Bereavement and grief: to be determined    No past medical history on file. No past surgical history on file. No family history on file. Unable to obtain familyhistory due to altered mental status    No Known Allergies    Review of Systems:   See palliative care ROS/ESAS below; Detail ROS unable to obtain due to patient's mental status    Social history:  Marital status:   Living status: alone with son assisting him.     Family Meeting:  Participants:Son [facility])  Family meeting was held to discuss:diagnosis, prognosis, treatment options, goals of care, prior expressed wishes, advancedcare planning and symptom management      Objective:     Physical Exam  BP (!) 164/75   Pulse 83   Temp 98.5 °F (36.9 °C) (Temporal)   Resp 13   Ht 6' (1.829 m)   Wt 159 lb 3.2 eating well, 10= not eating) 10   Wellbeing Score  (10= worst sense of well-being) 8   Constipation    10   DyspneaScore    (0= no shortness of breath) 3     Assessed by: provider. FLACC Scale (For Pain Assessment of the Non-Verbal Patient)  Face: 0- no particular expression  Legs: 0- normal position or relaxed  Activity: 0-lying quietly, moves easily  Cry: 0-no cry  Consolability:0-content, relaxed  Total Score: 0    Results/Verification of Data Review   Objective data reviewed: labs, images, records, medicationuse, vitals and chart    Data in Support of Terminal Illness:N/A Palliative Patient. Electronically signed by Chelly Hinson DO on 11/4/2018 at 10:23 AM    Thank you for allowing Palliative Medicine to participate in the care of Erik Lawrence. Note: This reportwas completed using computerize voiced recognition software. Every effort has been made to ensure accuracy; however, inadvertent computerized transcription errors may be present.

## 2018-11-04 NOTE — PROGRESS NOTES
Neuro Science Intensive Care Unit  Critical Care  Daily Progress Note 11/4/2018    Date of Admission: 11/3/18  CC:  [facility], [facility]. HOSPITAL EVENTS  11/3/18 Admitted with hemorrhagic stroke  11/4/18 Code status updated to Limited. OVERNIGHT EVENTS: T max 98.4 F. Require additional doses of antihypertensive agents in the previous 24 hours. PHYSICAL EXAM:     General appearance:  Comfortable.         NEUROLOGIC:    GCS:    3 - Opens eyes to loud noise or command   4 - Moves part of body but does not remove noxious stimulus  2 - Moans, makes unintelligible sounds            Pupil size:      Left 3 mm       Right 3 mm  Pupil reaction: Yes   PERRLA  Wiggles fingers: Left No Right No  Hand grasp:   Left No    Right No  Wiggles toes: Left No    Right No  Plantar flexion: Left No  Right No        CONSTITUTIONAL: No acute distress, resting in  bed. Large cephalohematoma mid forehead. Cervical collar on  CARDIOVASCULAR: S1 S2, regular rate, regular rhythm, Monitor:  SR  PULMONARY:    Respirations unlabored. No rhonchi/rales/wheezes. RENAL:   . Burgos to gravity, clear yellow urine. ABDOMEN: Soft, nontender, nondistended, nontympanic, normal bowel sounds. MUSCULOSKELETAL: Random movement on the right side. Minimal movement on the left  SKIN: No rashes/ecchymosis, , warm/dry, good capillary refill. IV Access:  Peripheral IVs.      ASSESSMENT & PLAN   Active Problems:    Intracranial hemorrhage (HCC)    Rhabdomyolysis    DANNIE (acute kidney injury) ([facility] [address]    Urinary retention  Resolved Problems:    * No resolved hospital problems. *        · Neuro:  left temporal IPH, bilateral SAH parietal frontal lobes, and interventricular blood, status post fall or hemorrhagic stroke. Neurosurgery  following. Monitor neuro status. Keppra  · CV: Hypertension-improving. SBP goal <170 mm Hg.  when necessary labetalol and hydralazine  · Pulm: No acute issues.  Monitor respiratory status  · GI: Nothing by mouth due to decreased

## 2018-11-04 NOTE — PLAN OF CARE
Problem: Risk for Impaired Skin Integrity  Goal: Tissue integrity - skin and mucous membranes  Structural intactness and normal physiological function of skin and  mucous membranes.    Outcome: Not Met This Shift      Problem: Falls - Risk of:  Goal: Will remain free from falls  Will remain free from falls   Outcome: Met This Shift    Goal: Absence of physical injury  Absence of physical injury   Outcome: Not Met This Shift      Problem: Aspiration:  Goal: Absence of aspiration  Absence of aspiration  Outcome: Met This Shift      Problem: Mental Status - Impaired:  Goal: Mental status will be restored to baseline  Mental status will be restored to baseline  Outcome: Not Met This Shift      Problem: Pain:  Goal: Pain level will decrease  Pain level will decrease  Outcome: Met This Shift    Goal: Recognizes and communicates pain  Recognizes and communicates pain  Outcome: Not Met This Shift    Goal: Control of acute pain  Control of acute pain  Outcome: Met This Shift    Goal: Control of chronic pain  Control of chronic pain  Outcome: Met This Shift

## 2018-11-04 NOTE — PROCEDURES
Luz Pierce is a 80 y.o. male patient. 1. Intracranial bleed ([facility] [address]    2. Community acquired pneumonia of left lung, unspecified part of lung    3. [facility]    4. DANNIE (acute kidney injury) ([facility]    5. Hypertension, unspecified type      No past medical history on file. Blood pressure (!) 149/77, pulse 100, temperature 98.2 °F (36.8 °C), temperature source Temporal, resp. rate 15, height 6' (1.829 m), weight 149 lb 9.6 oz (67.9 kg), SpO2 100 %. Central Line  Date/Time: 11/3/2018 11:23 PM  Performed by: Iris Daniels  Authorized by: Iris Daniels   Consent: Verbal consent not obtained. Written consent obtained. Risks and benefits: risks, benefits and alternatives were discussed  Consent given by: guardian  Patient understanding: patient states understanding of the procedure being performed  Patient consent: the patient's understanding of the procedure matches consent given  Procedure consent: procedure consent matches procedure scheduled  Required items: required blood products, implants, devices, and special equipment available  Patient identity confirmed: verbally with patient and arm band  Time out: Immediately prior to procedure a \"time out\" was called to verify the correct patient, procedure, equipment, support staff and site/side marked as required.   Indications: vascular access  Anesthesia: local infiltration    Anesthesia:  Local Anesthetic: lidocaine 1% without epinephrine  Anesthetic total: 5 mL    Sedation:  Patient sedated: no  Preparation: skin prepped with ChloraPrep  Sterile barriers: all five maximum sterile barriers used - cap, mask, sterile gown, sterile gloves, and large sterile sheet  Hand hygiene: hand hygiene performed prior to central venous catheter insertion  Location details: right internal jugular  Patient position: flat  Catheter type: triple lumen  Catheter size: 7 Fr  Pre-procedure: landmarks identified  Ultrasound guidance: yes  Sterile ultrasound techniques: sterile gel and sterile probe covers were used  Number of attempts: 2  Successful placement: yes  Post-procedure: line sutured and dressing applied  Assessment: blood return through all ports,  free fluid flow and placement verified by x-ray  Patient tolerance: Patient tolerated the procedure well with no immediate complications        Kayla Davila DO  11/3/2018      [facility]   Attending Physician Statement:  Late entry:  I was present during the entire procedure and was actively supervising and directing the resident. There were no complications.     Seun White MD, FACS  11/4/2018  5:54 AM

## 2018-11-05 LAB
ALBUMIN SERPL-MCNC: 2.9 G/DL (ref 3.5–4.7)
ALBUMIN SERPL-MCNC: 2.9 G/DL (ref 3.5–5.2)
ALP BLD-CCNC: 63 U/L (ref 40–129)
ALPHA-1-GLOBULIN: 0.3 G/DL (ref 0.2–0.4)
ALPHA-2-GLOBULIN: 0.7 G/FL (ref 0.5–1)
ALT SERPL-CCNC: 24 U/L (ref 0–40)
ANION GAP SERPL CALCULATED.3IONS-SCNC: 9 MMOL/L (ref 7–16)
ANISOCYTOSIS: ABNORMAL
AST SERPL-CCNC: 48 U/L (ref 0–39)
BASOPHILS ABSOLUTE: 0.03 E9/L (ref 0–0.2)
BASOPHILS RELATIVE PERCENT: 0.4 % (ref 0–2)
BETA GLOBULIN: 1 G/DL (ref 0.8–1.3)
BILIRUB SERPL-MCNC: 1.4 MG/DL (ref 0–1.2)
BUN BLDV-MCNC: 13 MG/DL (ref 8–23)
CALCIUM IONIZED: 1.4 MMOL/L (ref 1.15–1.33)
CALCIUM SERPL-MCNC: 8.7 MG/DL (ref 8.6–10.2)
CHLORIDE BLD-SCNC: 108 MMOL/L (ref 98–107)
CO2: 25 MMOL/L (ref 22–29)
CREAT SERPL-MCNC: 0.9 MG/DL (ref 0.7–1.2)
ELECTROPHORESIS: ABNORMAL
EOSINOPHILS ABSOLUTE: 0.03 E9/L (ref 0.05–0.5)
EOSINOPHILS RELATIVE PERCENT: 0.4 % (ref 0–6)
GAMMA GLOBULIN: 1.3 G/DL (ref 0.7–1.6)
GFR AFRICAN AMERICAN: >60
GFR NON-AFRICAN AMERICAN: >60 ML/MIN/1.73
GLUCOSE BLD-MCNC: 132 MG/DL (ref 74–99)
HCT VFR BLD CALC: 39.4 % (ref 37–54)
HEMOGLOBIN: 12.2 G/DL (ref 12.5–16.5)
IMMATURE GRANULOCYTES #: 0.03 E9/L
IMMATURE GRANULOCYTES %: 0.4 % (ref 0–5)
LYMPHOCYTES ABSOLUTE: 1.04 E9/L (ref 1.5–4)
LYMPHOCYTES RELATIVE PERCENT: 12.4 % (ref 20–42)
MAGNESIUM: 2 MG/DL (ref 1.6–2.6)
MCH RBC QN AUTO: 27.7 PG (ref 26–35)
MCHC RBC AUTO-ENTMCNC: 31 % (ref 32–34.5)
MCV RBC AUTO: 89.3 FL (ref 80–99.9)
MONOCYTES ABSOLUTE: 1.25 E9/L (ref 0.1–0.95)
MONOCYTES RELATIVE PERCENT: 14.9 % (ref 2–12)
MRSA CULTURE ONLY: NORMAL
NEUTROPHILS ABSOLUTE: 6.02 E9/L (ref 1.8–7.3)
NEUTROPHILS RELATIVE PERCENT: 71.5 % (ref 43–80)
PDW BLD-RTO: 15 FL (ref 11.5–15)
PHOSPHORUS: 2 MG/DL (ref 2.5–4.5)
PLATELET # BLD: 82 E9/L (ref 130–450)
PLATELET CONFIRMATION: NORMAL
PMV BLD AUTO: 12.2 FL (ref 7–12)
POTASSIUM SERPL-SCNC: 3.8 MMOL/L (ref 3.5–5)
RBC # BLD: 4.41 E12/L (ref 3.8–5.8)
SODIUM BLD-SCNC: 142 MMOL/L (ref 132–146)
TOTAL CK: 585 U/L (ref 20–200)
TOTAL PROTEIN: 5.9 G/DL (ref 6.4–8.3)
TRIGL SERPL-MCNC: 72 MG/DL (ref 0–149)
WBC # BLD: 8.4 E9/L (ref 4.5–11.5)

## 2018-11-05 PROCEDURE — G8981 BODY POS CURRENT STATUS: HCPCS

## 2018-11-05 PROCEDURE — S0028 INJECTION, FAMOTIDINE, 20 MG: HCPCS | Performed by: INTERNAL MEDICINE

## 2018-11-05 PROCEDURE — 2580000003 HC RX 258: Performed by: NURSE PRACTITIONER

## 2018-11-05 PROCEDURE — G8987 SELF CARE CURRENT STATUS: HCPCS

## 2018-11-05 PROCEDURE — 2580000003 HC RX 258: Performed by: INTERNAL MEDICINE

## 2018-11-05 PROCEDURE — 36592 COLLECT BLOOD FROM PICC: CPT

## 2018-11-05 PROCEDURE — 6360000002 HC RX W HCPCS: Performed by: INTERNAL MEDICINE

## 2018-11-05 PROCEDURE — G8988 SELF CARE GOAL STATUS: HCPCS

## 2018-11-05 PROCEDURE — 6370000000 HC RX 637 (ALT 250 FOR IP): Performed by: NURSE PRACTITIONER

## 2018-11-05 PROCEDURE — 83735 ASSAY OF MAGNESIUM: CPT

## 2018-11-05 PROCEDURE — 97166 OT EVAL MOD COMPLEX 45 MIN: CPT

## 2018-11-05 PROCEDURE — 97530 THERAPEUTIC ACTIVITIES: CPT

## 2018-11-05 PROCEDURE — 36415 COLL VENOUS BLD VENIPUNCTURE: CPT

## 2018-11-05 PROCEDURE — 2500000003 HC RX 250 WO HCPCS: Performed by: NURSE PRACTITIONER

## 2018-11-05 PROCEDURE — 84100 ASSAY OF PHOSPHORUS: CPT

## 2018-11-05 PROCEDURE — 80053 COMPREHEN METABOLIC PANEL: CPT

## 2018-11-05 PROCEDURE — 2060000000 HC ICU INTERMEDIATE R&B

## 2018-11-05 PROCEDURE — G8982 BODY POS GOAL STATUS: HCPCS

## 2018-11-05 PROCEDURE — 2500000003 HC RX 250 WO HCPCS: Performed by: INTERNAL MEDICINE

## 2018-11-05 PROCEDURE — 84478 ASSAY OF TRIGLYCERIDES: CPT

## 2018-11-05 PROCEDURE — 97162 PT EVAL MOD COMPLEX 30 MIN: CPT

## 2018-11-05 PROCEDURE — 82550 ASSAY OF CK (CPK): CPT

## 2018-11-05 PROCEDURE — 85025 COMPLETE CBC W/AUTO DIFF WBC: CPT

## 2018-11-05 PROCEDURE — 82330 ASSAY OF CALCIUM: CPT

## 2018-11-05 PROCEDURE — 6370000000 HC RX 637 (ALT 250 FOR IP): Performed by: INTERNAL MEDICINE

## 2018-11-05 RX ORDER — LABETALOL HYDROCHLORIDE 5 MG/ML
10 INJECTION, SOLUTION INTRAVENOUS EVERY 4 HOURS PRN
Status: DISCONTINUED | OUTPATIENT
Start: 2018-11-05 | End: 2018-11-09 | Stop reason: HOSPADM

## 2018-11-05 RX ORDER — LEVETIRACETAM 100 MG/ML
500 SOLUTION ORAL 2 TIMES DAILY
Status: DISCONTINUED | OUTPATIENT
Start: 2018-11-05 | End: 2018-11-09 | Stop reason: HOSPADM

## 2018-11-05 RX ORDER — HYDRALAZINE HYDROCHLORIDE 20 MG/ML
10 INJECTION INTRAMUSCULAR; INTRAVENOUS EVERY 4 HOURS PRN
Status: DISCONTINUED | OUTPATIENT
Start: 2018-11-05 | End: 2018-11-09 | Stop reason: HOSPADM

## 2018-11-05 RX ORDER — LISINOPRIL 10 MG/1
10 TABLET ORAL DAILY
Status: DISCONTINUED | OUTPATIENT
Start: 2018-11-05 | End: 2018-11-07

## 2018-11-05 RX ADMIN — POTASSIUM PHOSPHATE, MONOBASIC AND POTASSIUM PHOSPHATE, DIBASIC 30 MMOL: 224; 236 INJECTION, SOLUTION INTRAVENOUS at 08:49

## 2018-11-05 RX ADMIN — HYDRALAZINE HYDROCHLORIDE 10 MG: 20 INJECTION INTRAMUSCULAR; INTRAVENOUS at 18:37

## 2018-11-05 RX ADMIN — FAMOTIDINE 20 MG: 10 INJECTION, SOLUTION INTRAVENOUS at 08:49

## 2018-11-05 RX ADMIN — AZITHROMYCIN MONOHYDRATE 500 MG: 500 INJECTION, POWDER, LYOPHILIZED, FOR SOLUTION INTRAVENOUS at 21:39

## 2018-11-05 RX ADMIN — Medication 10 ML: at 08:49

## 2018-11-05 RX ADMIN — DEXTROSE AND SODIUM CHLORIDE: 5; 450 INJECTION, SOLUTION INTRAVENOUS at 01:41

## 2018-11-05 RX ADMIN — Medication 10 ML: at 21:11

## 2018-11-05 RX ADMIN — CEFTRIAXONE SODIUM 1 G: 1 INJECTION, POWDER, FOR SOLUTION INTRAMUSCULAR; INTRAVENOUS at 16:42

## 2018-11-05 RX ADMIN — LISINOPRIL 10 MG: 10 TABLET ORAL at 12:38

## 2018-11-05 RX ADMIN — DEXTROSE AND SODIUM CHLORIDE: 5; 450 INJECTION, SOLUTION INTRAVENOUS at 12:01

## 2018-11-05 RX ADMIN — LEVETIRACETAM 500 MG: 100 SOLUTION ORAL at 08:49

## 2018-11-05 RX ADMIN — LEVETIRACETAM 500 MG: 100 SOLUTION ORAL at 21:11

## 2018-11-05 NOTE — FLOWSHEET NOTE
Dr. Forrest Caraballo called per perfect serve instructions. Notified of pt moaning and tender abdomen with palpation. Pt does acknowledge \"stomach pain. \" Verbal order received.

## 2018-11-05 NOTE — PROGRESS NOTES
collaboration. Pt reoriented to place and time. Pt was minimally conversive throughout session and unable to provide social history at this time. Pt presented with comprehension deficits with mobility commands which resulted in increased assistance. Pt sat EOB for 5 minutes. Instructed pt on proper hand placement for sit to stand. Pt initially stood with a right lateral lean and required assistance to correct. Assistance required for pt to widen MEJIA. Pt was soiled upon standing; RN present for hygiene care. Pt tolerated standing for a total of 10 minutes. Attempted ambulation but pt had difficulty with commands. Physical assistance required to advance RLE. Pt returned to chair position in bed with all needs met and call light in reach. Pt would benefit from continued therapy. Patient education  Pt educated on safety    Patient response to education:   Pt verbalized understanding Pt demonstrated skill Pt requires further education in this area   x  x     Pts/ family goals   1. None stated    Patient and or family understand(s) diagnosis, prognosis, and plan of care. PLAN  PT care will be provided in accordance with the objectives noted above. Whenever appropriate, clear delegation orders will be provided for nursing staff. Exercises and functional mobility practice will be used as well as appropriate assistive devices or modalities to obtain goals. Patient and family education will also be administered as needed. Frequency of treatments will be 2-5x/week as able.     Time in: 1025  Time out: [address] [facility], [address], [address] [place], [place]  [id number]

## 2018-11-05 NOTE — PLAN OF CARE
Problem: Nutrition  Goal: Optimal nutrition therapy  Outcome: Ongoing  Nutrition Problem: Inadequate oral intake  Nutritional Goals:  Tolerance to TF at goal rate

## 2018-11-05 NOTE — PROGRESS NOTES
11/05/18 1000   Gross per 24 hour   Intake             2589 ml   Output             2485 ml   Net              104 ml         Wt Readings from Last 3 Encounters:   11/04/18 159 lb 3.2 oz (72.2 kg)   10/16/18 157 lb 11.2 oz (71.5 kg)       Constitutional:  Pt is in no acute distress  Head: normocephalic, atraumatic  Neck: no JVD  Cardiovascular: regular rate and rhythm, no murmurs, gallops, or rubs  Respiratory:  No rales, rhochi, or wheezes  Gastrointestinal:  Soft, nontender, nondistended, bowel sounds x 4  Ext: trace edema  Skin: dry, no rash      MEDS (scheduled):    potassium phosphate IVPB  30 mmol Intravenous Once    levETIRAcetam  500 mg Oral BID    famotidine (PEPCID) injection  20 mg Intravenous Daily    sodium chloride flush  10 mL Intravenous 2 times per day    cefTRIAXone (ROCEPHIN) IV  1 g Intravenous Q24H    azithromycin  500 mg Intravenous Q24H    pneumococcal 13-valent conjugate  0.5 mL Intramuscular Prior to discharge    influenza virus vaccine  0.5 mL Intramuscular Prior to discharge       MEDS (infusions):   dextrose 5 % and 0.45 % NaCl 100 mL/hr at 11/05/18 0141       MEDS (prn):  sodium chloride flush, magnesium hydroxide, ondansetron, acetaminophen, acetaminophen, hydrALAZINE, labetalol    DATA:    Recent Labs      11/03/18   1220  11/04/18   0445  11/05/18   0500   WBC  16.7*  11.8*  8.4   HGB  14.6  13.4  12.2*   HCT  46.2  41.9  39.4   MCV  86.8  87.7  89.3   PLT  116*  112*  82*     Recent Labs      11/03/18   1220  11/04/18   0445  11/05/18   0415   NA  142  145  142   K  4.1  3.9  3.8   CL  107  109*  108*   CO2  20*  27  25   BUN  38*  24*  13   CREATININE  2.0*  1.1  0.9   LABGLOM  38  >60  >60   GLUCOSE  147*  151*  132*   CALCIUM  10.7*  9.7  8.7   ALT  35  29  24   AST  126*  79*  48*   BILITOT  1.7*  1.1  1.4*   ALKPHOS  79  69  63   MG   --   2.2  2.0   PHOS   --   2.2*  2.0*       Lab Results   Component Value Date    LABALBU 2.9 (L) 11/05/2018    LABALBU 3.2 (L) 11/04/2018    LABALBU 3.6 11/03/2018     Lab Results   Component Value Date    TSH 1.950 11/03/2018     No results found for: PHART, PO2ART, GVL2NKS    Iron Studies: No results found for: IRON, TIBC, FERRITIN     Lab Results   Component Value Date    CKTOTAL 585 (H) 11/05/2018          IMPRESSION/RECOMMENDATIONS:      1. arf  Resolved. Cr 0.9  Prerenal azotemia resolved with ivf    2. ICH    3. rhabdo  improved    4. Hypophosphatemia  Replace prn    5. htn  In setting of ICH  Running 140s  Start his outpt lisinopril     Rema Ormond.  Dallas Garcia MD

## 2018-11-05 NOTE — PLAN OF CARE
Problem: Risk for Impaired Skin Integrity  Goal: Tissue integrity - skin and mucous membranes  Structural intactness and normal physiological function of skin and  mucous membranes.    Outcome: Met This Shift      Problem: Falls - Risk of:  Goal: Will remain free from falls  Will remain free from falls   Outcome: Not Met This Shift    Goal: Absence of physical injury  Absence of physical injury   Outcome: Met This Shift      Problem: Aspiration:  Goal: Absence of aspiration  Absence of aspiration   Outcome: Met This Shift      Problem: Mental Status - Impaired:  Goal: Mental status will be restored to baseline  Mental status will be restored to baseline   Outcome: Not Met This Shift      Problem: Pain:  Goal: Pain level will decrease  Pain level will decrease   Outcome: Met This Shift    Goal: Recognizes and communicates pain  Recognizes and communicates pain   Outcome: Met This Shift    Goal: Control of acute pain  Control of acute pain   Outcome: Met This Shift    Goal: Control of chronic pain  Control of chronic pain   Outcome: Met This Shift      Problem: Restraint Use - Nonviolent/Non-Self-Destructive Behavior:  Goal: Absence of restraint indications  Absence of restraint indications    Outcome: Not Met This Shift    Goal: Absence of restraint-related injury  Absence of restraint-related injury    Outcome: Met This Shift

## 2018-11-05 NOTE — CARE COORDINATION
Social Work/Discharge Planning:    SW consult noted. ROHAN placed a call to pt son [place]. Pt son stated that pt lives alone in a duplex apartment. Pt son stated that pt apartment is on the first floor. PTA pt son stated that pt was independent with no DME. Pt son denies pt has any hx with SNF/HHC. PCP is Dr. Willye Dandy and primary pharmacy is [facility]. Pt son stated that plan at discharge at this time is for pt to return home and he will provide pt transportation home for pt pending pt condition. ROHAN discussed CLAUDIA with pt son. Pt son stated that if CLAUDIA is needed at d/c CLAUDIA choice will be  [facility]. SW discussed additional CLAUDIA choices with pt son, pt son stated that he would need to look over CLAUDIA list for additional choices. SW offered to leave CLAUDIA list in pt room. Pt son agreeable. SW placed CLAUDIA list in pt room. ROHAN will follow.     Electronically signed by JOSHUA Sweet on 11/5/2018 at 10:26 AM

## 2018-11-05 NOTE — PROGRESS NOTES
Nutrition Assessment    Type and Reason for Visit: Initial, Consult    Nutrition Recommendations: To prevent overfeeding, decrease rate of current TF to 35 ml/hr  Fluid Restricted formula @ 35 ml/hr will provide 840 ml tv, 1680 kcal, 70 gm pro, 588 ml free water    Malnutrition Assessment:  · Malnutrition Status: No malnutrition  · Context: Acute illness or injury  · Findings of the 6 clinical characteristics of malnutrition (Minimum of 2 out of 6 clinical characteristics is required to make the diagnosis of moderate or severe Protein Calorie Malnutrition based on AND/ASPEN Guidelines):  1. Energy Intake-Greater than 75%,  (x1 day w/ TF)    2. Weight Loss-Unable to assess, unable to assess  3. Fat Loss-No significant subcutaneous fat loss    4. Muscle Loss-No significant muscle mass loss    5. Fluid Accumulation-No significant fluid accumulation    6.   Strength-Not measured    Nutrition Diagnosis:   · Problem: Inadequate oral intake  · Etiology: related to Cognitive or neurological impairment     Signs and symptoms:  as evidenced by NPO status due to medical condition, Nutrition support - EN    Nutrition Assessment:  · Subjective Assessment: pt disoriented w/ h/o dementia, adm w/ ICH found down at bottom of stairs  · Nutrition-Focused Physical Findings: soft abd, hypoactive BS, no noted edema, -I/Os, corpak w/ TF  · Wound Type: None  · Current Nutrition Therapies:  · Oral Diet Orders: NPO   · Tube Feeding (TF) Orders:   · Feeding Route: Nasoenteric  · Formula: Fluid Restricted  · Rate (ml/hr):40 ml/hr    · Volume (ml/day): 960 ml tv  · Duration: Continuous 24hrs  · TF Residuals: Less than or equal to 250ml  · Water Flushes: None  · Current TF & Flush Orders Provides: 1920 kcal, 80 gm pro, 672 ml free water  · Anthropometric Measures:  · Ht: 6' (182.9 cm)   · Current Body Wt: 164 lb (74.4 kg) (bed scale 11/5)  · Admission Body Wt: 149 lb (67.6 kg) (first measured 11/3)  · Usual Body Wt: 157 lb (71.2 kg) (actual per EMR 10/2018)  · % Weight Change: noted wt gain since adm, question accuracy of adm wt, unable to accurately assess wt changes at this time     · Ideal Body Wt: 178 lb (80.7 kg), % Ideal Body 88% (using UBW)  · BMI Classification: BMI 18.5 - 24.9 Normal Weight  · Comparative Standards (Estimated Nutrition Needs):  · Estimated Daily Total Kcal:  (MSJ 1344 x 1.2 SF)  · Estimated Daily Protein (g): 70-85    Nutrition Risk Level: Moderate    Nutrition Interventions:   Continued Inpatient Monitoring, Education not appropriate at this time, Coordination of Care    Nutrition Evaluation:   · Evaluation: Goals set   · Goals: Tolerance to TF at goal rate   · Monitoring: NPO Status, TF Intake, TF Tolerance, Gastric Residuals, Fluid Balance, Mental Status/Confusion, Weight, Comparative Standards, Pertinent Labs    See Adult Nutrition Doc Flowsheet for more detail.      Electronically signed by Edna Dave MS, RD, LD on 11/5/18 at 4:32 PM    Contact Number: [phone]

## 2018-11-05 NOTE — PROGRESS NOTES
intact to R/L; finger ID intact. Pt presents with decreased eye contact. Glasses:pt reports he used to wear glasses as a child                                                   Hearing: grossly WFL     RASS: -1 to 0  CAM-ICU: (+) Delirium    UE Assessment:  Hand Dominance: Right [x]  Left []     ROM Strength STM goal: PRN   RUE  Pt does not follow testing. Grossly 45 abd; WFL distal. 2-/5 shoulder  3-/5 elbow  2-/5 wrist  3-/5 grasp          Increase x 1/2 MG throughout     LUE Shoulder grossly 60; WFL distal 3-/5 shoulder  3-/5 elbow  3-/5 wrist  3-/5 grasp                    3+/5 throughout       Sensation: No c/o numbness or tingling in extremities   Tone: WNL   Edema: [facility]     Functional [facility]   Initial Eval Status  Date: 11/5 Treatment Status  Date: Short Term Goals  Treatment frequency: PRN 1-3 tx/wk   Feeding NPO/NGT/DEP               Min A after set up   while seated up in chair to increase activity tolerance once cleared for diet     Grooming Dep; Hand over hand assist to hold onto washcloth in B UE's to initiate task. Pt demo poor follow through. Pt with stronger grasp in non dominant L UE. Mod A after set up   while seated EOB/ sink level demonstrating G trunk control for balance and G tolerance; min cues to sequence tasks     UB dressing/bathing Dep                       Mod A   after set up; demonstrating G body awareness during tasks with  Min cues      LB dressing/bathing Dep                        Max A   using AE as needed for safe reach/ energy conservation       Toileting Dep                        Max A     Bed Mobility  Supine to sit: Dep+2    Sit to supine: Dep+2                        Mod A  in prep of ADL tasks & transfers   Functional Transfers Sit to stand:  Mod A    Stand to sit: Mod A                        Min A  sit<>stand/functional bathroom transfers using AD/DME as needed for balance and safety   Functional Mobility Max A [employer]              Mod A with

## 2018-11-06 LAB
ALBUMIN SERPL-MCNC: 2.7 G/DL (ref 3.5–5.2)
ALP BLD-CCNC: 64 U/L (ref 40–129)
ALT SERPL-CCNC: 24 U/L (ref 0–40)
ANION GAP SERPL CALCULATED.3IONS-SCNC: 9 MMOL/L (ref 7–16)
AST SERPL-CCNC: 33 U/L (ref 0–39)
BASOPHILS ABSOLUTE: 0.02 E9/L (ref 0–0.2)
BASOPHILS RELATIVE PERCENT: 0.3 % (ref 0–2)
BILIRUB SERPL-MCNC: 1 MG/DL (ref 0–1.2)
BUN BLDV-MCNC: 12 MG/DL (ref 8–23)
CALCIUM SERPL-MCNC: 9.2 MG/DL (ref 8.6–10.2)
CHLORIDE BLD-SCNC: 102 MMOL/L (ref 98–107)
CO2: 26 MMOL/L (ref 22–29)
CREAT SERPL-MCNC: 0.8 MG/DL (ref 0.7–1.2)
EOSINOPHILS ABSOLUTE: 0.11 E9/L (ref 0.05–0.5)
EOSINOPHILS RELATIVE PERCENT: 1.5 % (ref 0–6)
GFR AFRICAN AMERICAN: >60
GFR NON-AFRICAN AMERICAN: >60 ML/MIN/1.73
GLUCOSE BLD-MCNC: 168 MG/DL (ref 74–99)
HCT VFR BLD CALC: 40.6 % (ref 37–54)
HEMOGLOBIN: 12.7 G/DL (ref 12.5–16.5)
IMMATURE GRANULOCYTES #: 0.03 E9/L
IMMATURE GRANULOCYTES %: 0.4 % (ref 0–5)
LYMPHOCYTES ABSOLUTE: 0.91 E9/L (ref 1.5–4)
LYMPHOCYTES RELATIVE PERCENT: 12.1 % (ref 20–42)
MAGNESIUM: 2 MG/DL (ref 1.6–2.6)
MCH RBC QN AUTO: 27.9 PG (ref 26–35)
MCHC RBC AUTO-ENTMCNC: 31.3 % (ref 32–34.5)
MCV RBC AUTO: 89.2 FL (ref 80–99.9)
MONOCYTES ABSOLUTE: 1.38 E9/L (ref 0.1–0.95)
MONOCYTES RELATIVE PERCENT: 18.4 % (ref 2–12)
NEUTROPHILS ABSOLUTE: 5.05 E9/L (ref 1.8–7.3)
NEUTROPHILS RELATIVE PERCENT: 67.3 % (ref 43–80)
PDW BLD-RTO: 14.8 FL (ref 11.5–15)
PHOSPHORUS: 1.7 MG/DL (ref 2.5–4.5)
PLATELET # BLD: 85 E9/L (ref 130–450)
PLATELET CONFIRMATION: NORMAL
PMV BLD AUTO: 13.8 FL (ref 7–12)
POTASSIUM SERPL-SCNC: 3.8 MMOL/L (ref 3.5–5)
RBC # BLD: 4.55 E12/L (ref 3.8–5.8)
RBC # BLD: NORMAL 10*6/UL
SODIUM BLD-SCNC: 137 MMOL/L (ref 132–146)
TOTAL PROTEIN: 5.9 G/DL (ref 6.4–8.3)
URINE CULTURE, ROUTINE: NORMAL
WBC # BLD: 7.5 E9/L (ref 4.5–11.5)

## 2018-11-06 PROCEDURE — S0028 INJECTION, FAMOTIDINE, 20 MG: HCPCS | Performed by: INTERNAL MEDICINE

## 2018-11-06 PROCEDURE — 6370000000 HC RX 637 (ALT 250 FOR IP): Performed by: NURSE PRACTITIONER

## 2018-11-06 PROCEDURE — 2060000000 HC ICU INTERMEDIATE R&B

## 2018-11-06 PROCEDURE — 36415 COLL VENOUS BLD VENIPUNCTURE: CPT

## 2018-11-06 PROCEDURE — 2580000003 HC RX 258: Performed by: INTERNAL MEDICINE

## 2018-11-06 PROCEDURE — 2500000003 HC RX 250 WO HCPCS: Performed by: INTERNAL MEDICINE

## 2018-11-06 PROCEDURE — 84100 ASSAY OF PHOSPHORUS: CPT

## 2018-11-06 PROCEDURE — 6370000000 HC RX 637 (ALT 250 FOR IP): Performed by: INTERNAL MEDICINE

## 2018-11-06 PROCEDURE — 83735 ASSAY OF MAGNESIUM: CPT

## 2018-11-06 PROCEDURE — 6360000002 HC RX W HCPCS: Performed by: INTERNAL MEDICINE

## 2018-11-06 PROCEDURE — 80053 COMPREHEN METABOLIC PANEL: CPT

## 2018-11-06 PROCEDURE — 85025 COMPLETE CBC W/AUTO DIFF WBC: CPT

## 2018-11-06 PROCEDURE — 2700000000 HC OXYGEN THERAPY PER DAY

## 2018-11-06 RX ORDER — HALOPERIDOL 5 MG/ML
1 INJECTION INTRAMUSCULAR EVERY 4 HOURS PRN
Status: DISCONTINUED | OUTPATIENT
Start: 2018-11-06 | End: 2018-11-09 | Stop reason: HOSPADM

## 2018-11-06 RX ADMIN — FAMOTIDINE 20 MG: 10 INJECTION, SOLUTION INTRAVENOUS at 08:15

## 2018-11-06 RX ADMIN — AZITHROMYCIN MONOHYDRATE 500 MG: 500 INJECTION, POWDER, LYOPHILIZED, FOR SOLUTION INTRAVENOUS at 17:27

## 2018-11-06 RX ADMIN — LEVETIRACETAM 500 MG: 100 SOLUTION ORAL at 08:15

## 2018-11-06 RX ADMIN — LEVETIRACETAM 500 MG: 100 SOLUTION ORAL at 20:05

## 2018-11-06 RX ADMIN — LABETALOL HYDROCHLORIDE 10 MG: 5 INJECTION INTRAVENOUS at 16:09

## 2018-11-06 RX ADMIN — LABETALOL HYDROCHLORIDE 10 MG: 5 INJECTION INTRAVENOUS at 10:19

## 2018-11-06 RX ADMIN — DEXTROSE AND SODIUM CHLORIDE: 5; 450 INJECTION, SOLUTION INTRAVENOUS at 07:01

## 2018-11-06 RX ADMIN — Medication 10 ML: at 08:18

## 2018-11-06 RX ADMIN — HYDRALAZINE HYDROCHLORIDE 10 MG: 20 INJECTION INTRAMUSCULAR; INTRAVENOUS at 07:01

## 2018-11-06 RX ADMIN — ACETAMINOPHEN 650 MG: 325 TABLET, FILM COATED ORAL at 10:19

## 2018-11-06 RX ADMIN — LISINOPRIL 10 MG: 10 TABLET ORAL at 08:15

## 2018-11-06 RX ADMIN — CEFTRIAXONE SODIUM 1 G: 1 INJECTION, POWDER, FOR SOLUTION INTRAMUSCULAR; INTRAVENOUS at 16:09

## 2018-11-06 RX ADMIN — ACETAMINOPHEN 650 MG: 325 TABLET, FILM COATED ORAL at 20:05

## 2018-11-06 ASSESSMENT — PAIN SCALES - GENERAL
PAINLEVEL_OUTOF10: 0
PAINLEVEL_OUTOF10: 2
PAINLEVEL_OUTOF10: 6

## 2018-11-06 ASSESSMENT — PAIN SCALES - WONG BAKER: WONGBAKER_NUMERICALRESPONSE: 6

## 2018-11-06 NOTE — PLAN OF CARE
Problem: Falls - Risk of:  Goal: Will remain free from falls  Will remain free from falls   Outcome: Met This Shift      Problem: Aspiration:  Goal: Absence of aspiration  Absence of aspiration   Outcome: Met This Shift      Problem: Mental Status - Impaired:  Goal: Mental status will be restored to baseline  Mental status will be restored to baseline   Outcome: Not Met This Shift      Problem: Pain:  Goal: Pain level will decrease  Pain level will decrease   Outcome: Met This Shift      Problem: Restraint Use - Nonviolent/Non-Self-Destructive Behavior:  Goal: Absence of restraint indications  Absence of restraint indications    Outcome: Not Met This Shift

## 2018-11-06 NOTE — PROGRESS NOTES
atraumatic  Neck: no JVD  Cardiovascular: regular rate and rhythm, no murmurs, gallops, or rubs  Respiratory:  No rales, rhochi, or wheezes  Gastrointestinal:  Soft, nontender, nondistended, bowel sounds x 4  Ext: trace edema  Skin: dry, no rash      MEDS (scheduled):    levETIRAcetam  500 mg Oral BID    lisinopril  10 mg Oral Daily    famotidine (PEPCID) injection  20 mg Intravenous Daily    sodium chloride flush  10 mL Intravenous 2 times per day    cefTRIAXone (ROCEPHIN) IV  1 g Intravenous Q24H    azithromycin  500 mg Intravenous Q24H    pneumococcal 13-valent conjugate  0.5 mL Intramuscular Prior to discharge    influenza virus vaccine  0.5 mL Intramuscular Prior to discharge       MEDS (infusions):   dextrose 5 % and 0.45 % NaCl 100 mL/hr at 11/06/18 0701       MEDS (prn):  hydrALAZINE, labetalol, sodium chloride flush, magnesium hydroxide, ondansetron, acetaminophen, acetaminophen    DATA:    Recent Labs      11/04/18   0445  11/05/18   0500  11/06/18   0551   WBC  11.8*  8.4  7.5   HGB  13.4  12.2*  12.7   HCT  41.9  39.4  40.6   MCV  87.7  89.3  89.2   PLT  112*  82*  85*     Recent Labs      11/04/18   0445  11/05/18   0415  11/06/18   0551   NA  145  142  137   K  3.9  3.8  3.8   CL  109*  108*  102   CO2  27  25  26   BUN  24*  13  12   CREATININE  1.1  0.9  0.8   LABGLOM  >60  >60  >60   GLUCOSE  151*  132*  168*   CALCIUM  9.7  8.7  9.2   ALT  29  24  24   AST  79*  48*  33   BILITOT  1.1  1.4*  1.0   ALKPHOS  69  63  64   MG  2.2  2.0  2.0   PHOS  2.2*  2.0*  1.7*       Lab Results   Component Value Date    LABALBU 2.7 (L) 11/06/2018    LABALBU 2.9 (L) 11/05/2018    LABALBU 3.2 (L) 11/04/2018    LABALBU 2.9 (L) 11/04/2018     Lab Results   Component Value Date    TSH 1.950 11/03/2018     No results found for: PHART, PO2ART, [id number]    Iron Studies: No results found for: IRON, TIBC, FERRITIN     Lab Results   Component Value Date    CKTOTAL 585 (H) 11/05/2018

## 2018-11-06 NOTE — PROGRESS NOTES
Subjective: The patient is awake but demented and not able to provide history   resting comfortably   In nad   Objective:    BP (!) 145/67   Pulse 69   Temp 98.8 °F (37.1 °C) (Temporal)   Resp 18   Ht 6' (1.829 m)   Wt 161 lb 7 oz (73.2 kg)   SpO2 96%   BMI 21.89 kg/m²     In: 1175 [I.V.:504; NG/GT:671]  Out: 1100     HEENT:tube feeds in place via korpack  cchymosis on forehead  NCAT,  PERRLA, No JVD  Heart:  RRR, no murmurs, gallops, or rubs.   Lungs:  CTA bilaterally, no wheeze, rales or rhonchi  Abd: bowel sounds present, nontender, nondistended, no masses  Extrem:  No clubbing, cyanosis, or edema     Recent Labs      11/04/18   0445  11/05/18   0500  11/06/18   0551   WBC  11.8*  8.4  7.5   HGB  13.4  12.2*  12.7   HCT  41.9  39.4  40.6   PLT  112*  82*  85*       Recent Labs      11/04/18   0445  11/05/18   0415  11/06/18   0551   NA  145  142  137   K  3.9  3.8  3.8   CL  109*  108*  102   CO2  27  25  26   BUN  24*  13  12   CREATININE  1.1  0.9  0.8   CALCIUM  9.7  8.7  9.2       Assessment:    Patient Active Problem List   Diagnosis    Intracranial bleed (HCC)    Rhabdomyolysis    [facility]) ([facility] [address]    Urinary retention    Hemorrhagic stroke ([facility] [address]    Palliative care encounter    Goals of care, counseling/discussion       Plan:    admitted to NSICU for evaluation of hemorrhagic stroke   Continue current management with azithro and rocephin - leukocytosis resolved   No oac or antiplatelet products   Check swallow evaluation   If fails, will require PEG placement if ok with family   Labs and vital stable  Prn bp meds   Socail; service to assist with dc plan       DVT and GERD prophylaxis  All consultants notes reviewed    Torres Cabrera MD  12:54 PM  11/6/2018

## 2018-11-07 ENCOUNTER — APPOINTMENT (OUTPATIENT)
Dept: GENERAL RADIOLOGY | Age: 83
DRG: 065 | End: 2018-11-07
Payer: MEDICARE

## 2018-11-07 LAB
ALBUMIN SERPL-MCNC: 2.8 G/DL (ref 3.5–5.2)
ALP BLD-CCNC: 68 U/L (ref 40–129)
ALT SERPL-CCNC: 27 U/L (ref 0–40)
ANION GAP SERPL CALCULATED.3IONS-SCNC: 11 MMOL/L (ref 7–16)
AST SERPL-CCNC: 26 U/L (ref 0–39)
BASOPHILS ABSOLUTE: 0.02 E9/L (ref 0–0.2)
BASOPHILS RELATIVE PERCENT: 0.2 % (ref 0–2)
BILIRUB SERPL-MCNC: 1.2 MG/DL (ref 0–1.2)
BUN BLDV-MCNC: 12 MG/DL (ref 8–23)
CALCIUM SERPL-MCNC: 9.3 MG/DL (ref 8.6–10.2)
CHLORIDE BLD-SCNC: 102 MMOL/L (ref 98–107)
CO2: 24 MMOL/L (ref 22–29)
CREAT SERPL-MCNC: 0.8 MG/DL (ref 0.7–1.2)
EOSINOPHILS ABSOLUTE: 0.4 E9/L (ref 0.05–0.5)
EOSINOPHILS RELATIVE PERCENT: 4.5 % (ref 0–6)
GFR AFRICAN AMERICAN: >60
GFR NON-AFRICAN AMERICAN: >60 ML/MIN/1.73
GLUCOSE BLD-MCNC: 161 MG/DL (ref 74–99)
HCT VFR BLD CALC: 41 % (ref 37–54)
HEMOGLOBIN: 12.9 G/DL (ref 12.5–16.5)
IMMATURE GRANULOCYTES #: 0.05 E9/L
IMMATURE GRANULOCYTES %: 0.6 % (ref 0–5)
LYMPHOCYTES ABSOLUTE: 1.08 E9/L (ref 1.5–4)
LYMPHOCYTES RELATIVE PERCENT: 12.1 % (ref 20–42)
MAGNESIUM: 2.1 MG/DL (ref 1.6–2.6)
MCH RBC QN AUTO: 27.6 PG (ref 26–35)
MCHC RBC AUTO-ENTMCNC: 31.5 % (ref 32–34.5)
MCV RBC AUTO: 87.8 FL (ref 80–99.9)
MONOCYTES ABSOLUTE: 1.31 E9/L (ref 0.1–0.95)
MONOCYTES RELATIVE PERCENT: 14.7 % (ref 2–12)
NEUTROPHILS ABSOLUTE: 6.08 E9/L (ref 1.8–7.3)
NEUTROPHILS RELATIVE PERCENT: 67.9 % (ref 43–80)
PDW BLD-RTO: 14.6 FL (ref 11.5–15)
PHOSPHORUS: 2.1 MG/DL (ref 2.5–4.5)
PLATELET # BLD: 84 E9/L (ref 130–450)
PLATELET CONFIRMATION: NORMAL
PMV BLD AUTO: 12.8 FL (ref 7–12)
POTASSIUM SERPL-SCNC: 4.2 MMOL/L (ref 3.5–5)
RBC # BLD: 4.67 E12/L (ref 3.8–5.8)
SODIUM BLD-SCNC: 137 MMOL/L (ref 132–146)
TOTAL PROTEIN: 6.2 G/DL (ref 6.4–8.3)
WBC # BLD: 8.9 E9/L (ref 4.5–11.5)

## 2018-11-07 PROCEDURE — 80053 COMPREHEN METABOLIC PANEL: CPT

## 2018-11-07 PROCEDURE — 36415 COLL VENOUS BLD VENIPUNCTURE: CPT

## 2018-11-07 PROCEDURE — 2700000000 HC OXYGEN THERAPY PER DAY

## 2018-11-07 PROCEDURE — 6360000002 HC RX W HCPCS: Performed by: INTERNAL MEDICINE

## 2018-11-07 PROCEDURE — 2060000000 HC ICU INTERMEDIATE R&B

## 2018-11-07 PROCEDURE — 74230 X-RAY XM SWLNG FUNCJ C+: CPT

## 2018-11-07 PROCEDURE — 85025 COMPLETE CBC W/AUTO DIFF WBC: CPT

## 2018-11-07 PROCEDURE — 2580000003 HC RX 258: Performed by: INTERNAL MEDICINE

## 2018-11-07 PROCEDURE — 2500000003 HC RX 250 WO HCPCS: Performed by: INTERNAL MEDICINE

## 2018-11-07 PROCEDURE — 92611 MOTION FLUOROSCOPY/SWALLOW: CPT

## 2018-11-07 PROCEDURE — 6370000000 HC RX 637 (ALT 250 FOR IP): Performed by: INTERNAL MEDICINE

## 2018-11-07 PROCEDURE — 84100 ASSAY OF PHOSPHORUS: CPT

## 2018-11-07 PROCEDURE — 97530 THERAPEUTIC ACTIVITIES: CPT

## 2018-11-07 PROCEDURE — 6370000000 HC RX 637 (ALT 250 FOR IP): Performed by: NURSE PRACTITIONER

## 2018-11-07 PROCEDURE — 83735 ASSAY OF MAGNESIUM: CPT

## 2018-11-07 RX ORDER — LISINOPRIL 20 MG/1
20 TABLET ORAL DAILY
Status: DISCONTINUED | OUTPATIENT
Start: 2018-11-08 | End: 2018-11-09 | Stop reason: HOSPADM

## 2018-11-07 RX ADMIN — LABETALOL HYDROCHLORIDE 10 MG: 5 INJECTION INTRAVENOUS at 18:16

## 2018-11-07 RX ADMIN — HYDRALAZINE HYDROCHLORIDE 10 MG: 20 INJECTION INTRAMUSCULAR; INTRAVENOUS at 20:43

## 2018-11-07 RX ADMIN — LABETALOL HYDROCHLORIDE 10 MG: 5 INJECTION INTRAVENOUS at 23:40

## 2018-11-07 RX ADMIN — Medication 10 ML: at 20:21

## 2018-11-07 RX ADMIN — HYDRALAZINE HYDROCHLORIDE 10 MG: 20 INJECTION INTRAMUSCULAR; INTRAVENOUS at 06:37

## 2018-11-07 RX ADMIN — Medication 10 ML: at 11:49

## 2018-11-07 RX ADMIN — LABETALOL HYDROCHLORIDE 10 MG: 5 INJECTION INTRAVENOUS at 02:31

## 2018-11-07 RX ADMIN — BARIUM SULFATE 58 ML: 960 POWDER, FOR SUSPENSION ORAL at 10:31

## 2018-11-07 RX ADMIN — ACETAMINOPHEN 650 MG: 325 TABLET, FILM COATED ORAL at 13:08

## 2018-11-07 RX ADMIN — LABETALOL HYDROCHLORIDE 10 MG: 5 INJECTION INTRAVENOUS at 13:08

## 2018-11-07 RX ADMIN — CEFTRIAXONE SODIUM 1 G: 1 INJECTION, POWDER, FOR SOLUTION INTRAMUSCULAR; INTRAVENOUS at 15:25

## 2018-11-07 RX ADMIN — LEVETIRACETAM 500 MG: 100 SOLUTION ORAL at 11:49

## 2018-11-07 RX ADMIN — ACETAMINOPHEN 650 MG: 325 TABLET, FILM COATED ORAL at 20:43

## 2018-11-07 RX ADMIN — LEVETIRACETAM 500 MG: 100 SOLUTION ORAL at 20:21

## 2018-11-07 RX ADMIN — LISINOPRIL 10 MG: 10 TABLET ORAL at 11:49

## 2018-11-07 RX ADMIN — AZITHROMYCIN MONOHYDRATE 500 MG: 500 INJECTION, POWDER, LYOPHILIZED, FOR SOLUTION INTRAVENOUS at 16:54

## 2018-11-07 RX ADMIN — BARIUM SULFATE 45 G: 0.6 CREAM ORAL at 10:30

## 2018-11-07 RX ADMIN — HYDRALAZINE HYDROCHLORIDE 10 MG: 20 INJECTION INTRAMUSCULAR; INTRAVENOUS at 16:56

## 2018-11-07 ASSESSMENT — PAIN SCALES - GENERAL
PAINLEVEL_OUTOF10: 5
PAINLEVEL_OUTOF10: 0
PAINLEVEL_OUTOF10: 3
PAINLEVEL_OUTOF10: 0

## 2018-11-07 NOTE — PROGRESS NOTES
Patient sleepy and cooperative. This nurse explained to patient to not pull at oxygen, IV tubing, ron catheter. Patient shakes head and stated \"yes\" that he understands. Bilateral wrist restraints removed at this time. Telesitter requested.

## 2018-11-07 NOTE — PROGRESS NOTES
Neurosurg progress note  VITALS:  BP (!) 167/83   Pulse 100   Temp 99.4 °F (37.4 °C) (Temporal)   Resp 18   Ht 6' (1.829 m)   Wt 166 lb (75.3 kg)   SpO2 93%   BMI 22.51 kg/m²   24HR INTAKE/OUTPUT:    Intake/Output Summary (Last 24 hours) at 18 1221  Last data filed at 18 8793   Gross per 24 hour   Intake          2093.24 ml   Output             1300 ml   Net           793.24 ml     Ct Abdomen Pelvis Wo Contrast Additional Contrast? None    Result Date: 11/3/2018  Patient MRN:  [id number] : 3/29/1931 Age: 80 years Gender: Male Order Date:  11/3/2018 2:30 PM EXAM: CT ABDOMEN PELVIS WO CONTRAST NUMBER OF IMAGES \ views:  18 INDICATION:  Trauma COMPARISON: None TECHNIQUE: Axial computerized tomography sections of the abdomen and pelvis with sagittal and coronal MPR reconstructions were obtained from the top of the diaphragm to the pelvis. Low-dose CT  acquisition technique included one of following options; 1 . Automated exposure control, 2. Adjustment of MA and or KV according to patient's size or 3. Use of iterative reconstruction. FINDINGS: Evaluation of the solid organs and vasculature are limited without the use of intravenous contrast. Evaluation of the gastrointestinal tract is limited without the use of oral contrast. THORACIC BASE: Please refer to the report for the CT of the chest that was performed concurrently with this exam. LIVER: Unremarkable. BILIARY: The gallbladder is present. PANCREAS: Unremarkable. SPLEEN: Unremarkable. ADRENALS:  Unremarkable. KIDNEYS:  A large exophytic cyst is seen arising from the upper pole of the right kidney. No hydronephrosis is seen. A Burgos catheter is present within the decompressed bladder. The prostate is enlarged, measuring 5.1 cm transversely. GI: No evidence of free air or bowel obstruction. The appendix is not visualized. Scattered colonic diverticuli are seen without evidence of diverticulitis. PELVIS: Unremarkable.  MSK: No acute osseous relevant prior studies available. FINDINGS:  Brain:  There is an intraparenchymal hemorrhage seen within the left posterior temporal parietal region measuring 1.9 cm craniocaudal dimension by 2.8 cm AP dimension by 2.4 cm transverse dimension. There is a surrounding zone of vasogenic edema seen. Subarachnoid blood is seen overlying the left frontoparietal convexity are present within the sylvian fissure on the left. A subarachnoid blood is seen overlying the right frontal parietal convexity as well. There is a small left subfalcine subdural hematoma posteriorly. There appears to be some tentorial subdural hemorrhage posteriorly as well. Focal hypoattenuation seen within the anterior limb of the internal capsule and apex of the left basal ganglia compatible with chronic lacunar infarction. Midline shift:  There is a trace amount of blood seen within the occipital horns bilaterally. Ventricles: Normal. No ventriculomegaly. Bones/joints: Normal. No acute fracture. Sinuses: Normal as visualized. No acute sinusitis. Mastoid air cells: Normal as visualized. No mastoid effusion. Soft tissues:  Soft tissue swelling and hematoma formation seen within the left posterior auricular a occipital soft tissues. 1. Left posterior temporal parietal intraparenchymal hemorrhage measuring 1.9 x 2.8 x 2.4 cm. this appears stable compared with November 3, 2018. 2. Subarachnoid blood overlies the frontoparietal convexities bilaterally are present within the left sylvian fissure. 3. Intraventricular blood seen within the dependent portions of the lateral ventricles 4. Small posterior subfalcine subdural hematoma and probable minimal posterior tentorial subdural hematomas bilaterally. 5.  There are no new acute intracranial findings.  This report has been electronically signed by Yarelis Caceres MD.    Ct Head Wo Contrast    Result Date: 11/3/2018  Patient MRN:  [id number] : 3/29/1931 Age: 80 years Gender: Male Order Date:

## 2018-11-07 NOTE — PROGRESS NOTES
seated EOB.     G   tolerance with moderate activity   Visual/  Perceptual  Impaired:   R/L discrimination impaired; decreased proprioception and spatial body awareness during transfers/ADLS                                 Comments: Upon arrival pt supine in bed with nurse present giving ok to treat at this time. Pt educated on techniques to increase independence and safety during ADL's, bed mobility, and functional transfers demonstrating poor understanding. BUE PROM completed all planes to maintain joint mobility. Tone noted in RUE with ROM/stretching. At end of session pt left seated upright in bed, call light within reach. · Pt has made limited progress towards set goals.      · Continue with current plan of care    Time in: 1:05  Time out: 1:35  Total Tx Time: 30 mins    Florentin Rausch

## 2018-11-07 NOTE — PROGRESS NOTES
During ROM exercises, patient continues to try and pull at Corpak and TLC. Restraints to continue at this time.

## 2018-11-07 NOTE — PROGRESS NOTES
or rubs  Respiratory:  No rales, rhochi, or wheezes  Gastrointestinal:  Soft, nontender, nondistended, bowel sounds x 4  Ext: trace edema  Skin: dry, no rash      MEDS (scheduled):    levETIRAcetam  500 mg Oral BID    lisinopril  10 mg Oral Daily    famotidine (PEPCID) injection  20 mg Intravenous Daily    sodium chloride flush  10 mL Intravenous 2 times per day    cefTRIAXone (ROCEPHIN) IV  1 g Intravenous Q24H    azithromycin  500 mg Intravenous Q24H    pneumococcal 13-valent conjugate  0.5 mL Intramuscular Prior to discharge    influenza virus vaccine  0.5 mL Intramuscular Prior to discharge       MEDS (infusions):   dextrose 5 % and 0.45 % NaCl 100 mL/hr at 11/07/18 0620       MEDS (prn):  haloperidol lactate, hydrALAZINE, labetalol, sodium chloride flush, magnesium hydroxide, ondansetron, acetaminophen, acetaminophen    DATA:    Recent Labs      11/05/18   0500  11/06/18   0551  11/07/18   0450   WBC  8.4  7.5  8.9   HGB  12.2*  12.7  12.9   HCT  39.4  40.6  41.0   MCV  89.3  89.2  87.8   PLT  82*  85*  84*     Recent Labs      11/05/18   0415  11/06/18   0551  11/07/18   0450   NA  142  137  137   K  3.8  3.8  4.2   CL  108*  102  102   CO2  25  26  24   BUN  13  12  12   CREATININE  0.9  0.8  0.8   LABGLOM  >60  >60  >60   GLUCOSE  132*  168*  161*   CALCIUM  8.7  9.2  9.3   ALT  24  24  27   AST  48*  33  26   BILITOT  1.4*  1.0  1.2   ALKPHOS  63  64  68   MG  2.0  2.0  2.1   PHOS  2.0*  1.7*  2.1*       Lab Results   Component Value Date    LABALBU 2.8 (L) 11/07/2018    LABALBU 2.7 (L) 11/06/2018    LABALBU 2.9 (L) 11/05/2018     Lab Results   Component Value Date    TSH 1.950 11/03/2018     No results found for: PHART, PO2ART, [id number]    Iron Studies: No results found for: IRON, TIBC, FERRITIN     Lab Results   Component Value Date    CKTOTAL 585 (H) 11/05/2018          IMPRESSION/RECOMMENDATIONS:      1. arf  Resolved. Cr 0.9  Prerenal azotemia resolved with ivf    2. ICH/cva    3.

## 2018-11-07 NOTE — PROGRESS NOTES
SPEECH/LANGUAGE PATHOLOGY  VIDEOFLUOROSCOPIC STUDY OF SWALLOWING [place] [place]      PATIENT NAME:  Evert Rebolledo      :  3/29/1931      TODAY'S DATE:  2018    SUMMARY OF EVALUATION     DYSPHAGIA DIAGNOSIS:   Mild-moderate oral dysphagia, mild pharyngeal dysphagia     DIET RECOMMENDATIONS: Dysphagia 1 diet (pureed foods) with regular consistency liquids. COMPENSATORY STRATEGIES:      []Double swallow with []all consistencies []thin []nectar []honey []pureed []ground []chopped []soft solid []solid       []Multiple swallow (  Times) with []all consistencies []thin []nectar []honey []pureed []ground []chopped []soft solid []solid     []Chin tuck with []all consistencies  []thin []nectar []honey []pureed []ground []chopped []soft solid []solid      []Throat clear after with []all consistencies []thin []nectar []honey  []pureed []ground []chopped []soft solid []solid      []Effortful swallow with []all consistencies  []thin []nectar []honey []pureed []ground []chopped []soft solid []solid     []Small bites/sips        []Alternate solids / liquids      []Check for oral pocketing     []No straw            []Spoon sip liquids        []       ASSISTANCE LEVEL:  []No assistance required   []Stand by assist   [x]Full assistance required  []Set up required   []Supervision with all PO intake    [] Malnutrition indicators have been identified and nursing has been notified to ensure a dietary consult is ordered.      THERAPY RECOMMENDATIONS:       [x]  Therapy is not recommended       []  Therapy is recommended to:     []  Improve oral motor strength and range of motion     []  Improve tongue base retraction      []  Improve laryngeal strength and range of motion     []  Address cricopharyngeal dysfunction (Shaker Exercises)    []  Mealtime assessment of patient's tolerance of prescribed diet     []  Repeat Video Swallowing Evaluation is recommended and requires a Physician order    []Therapy at the discretion of facility/treating Speech Pathologist                  PROCEDURE     Consistencies Administered During the Evaluation   Liquids: [x]Thin    []Nectar  [x]Honey   Solids:  [x]Pureed/Pudding  []Soft Solid  []Cookie   Other:       Method of Intake:   [x]Cup   [x]Spoon  [x]Straw  []Self Fed  [x]Fed by Clinician     Position:   [x]Upright seated  []Upright Standing  []Supine, elevated 45°   []Anterior/Posterior  [x]Lateral   []Oblique                   RESULTS     ORAL STAGE []Functional  [x]Abnormal      [] Dentition: ([]natural  []missing teeth []edentulous []partials []other )     [] Inadequate labial seal resulting anterior labial spillage from ([]right[] left []midline )     [] Decreased mastication due to ([]poor/missing dentition     []decreased lingual control []cognitive status)      [x] Delayed A-P transit due to ([]decreased initiation    [] reduced lingual strength[x]cognitive function )     [] Decreased bolus formation resulting in premature pharyngeal spillage      [x] Oral residuals:    []anterior sulcus  []sub lingually         []right buccal cavity  []left buccal cavity            []on tongue base       []throughout oral cavity       [x]on superior tongue   []on palate        []on velum          []Comments:        PHARYNGEAL STAGE     [] Functional  [x]Abnormal       ONSET TIME    [x] Onset time of the pharyngeal swallow was adequate      [] Delayed initiation of the pharyngeal swallow      ([]mild []moderate []marked []severe []absent ).         Swallow reflex was triggered at: ([]tongue base []valleculae []pyriform sinus)      PHARYNGEAL RESIDUALS          Vallecula/Pharyngeal Wall    []No significant residuals were noted in the vallecula      [x]Reduced tongue base retraction resulting in:      [x]residuals in vallecula      []residual on posterior pharyngeal wall      These residuals were noted for []thin []nectar[] honey [x]pureed []solid)        Which []cleared []did not clear []partially cleared

## 2018-11-08 LAB
ALBUMIN SERPL-MCNC: 2.9 G/DL (ref 3.5–5.2)
ALP BLD-CCNC: 79 U/L (ref 40–129)
ALT SERPL-CCNC: 37 U/L (ref 0–40)
ANION GAP SERPL CALCULATED.3IONS-SCNC: 11 MMOL/L (ref 7–16)
ANISOCYTOSIS: ABNORMAL
AST SERPL-CCNC: 31 U/L (ref 0–39)
BASOPHILS ABSOLUTE: 0.02 E9/L (ref 0–0.2)
BASOPHILS RELATIVE PERCENT: 0.2 % (ref 0–2)
BILIRUB SERPL-MCNC: 1.3 MG/DL (ref 0–1.2)
BLOOD CULTURE, ROUTINE: NORMAL
BUN BLDV-MCNC: 16 MG/DL (ref 8–23)
CALCIUM SERPL-MCNC: 9.6 MG/DL (ref 8.6–10.2)
CHLORIDE BLD-SCNC: 104 MMOL/L (ref 98–107)
CO2: 22 MMOL/L (ref 22–29)
CREAT SERPL-MCNC: 0.8 MG/DL (ref 0.7–1.2)
CULTURE, BLOOD 2: NORMAL
EOSINOPHILS ABSOLUTE: 0.26 E9/L (ref 0.05–0.5)
EOSINOPHILS RELATIVE PERCENT: 2.6 % (ref 0–6)
GFR AFRICAN AMERICAN: >60
GFR NON-AFRICAN AMERICAN: >60 ML/MIN/1.73
GLUCOSE BLD-MCNC: 162 MG/DL (ref 74–99)
HCT VFR BLD CALC: 42.6 % (ref 37–54)
HEMOGLOBIN: 13.6 G/DL (ref 12.5–16.5)
IMMATURE GRANULOCYTES #: 0.07 E9/L
IMMATURE GRANULOCYTES %: 0.7 % (ref 0–5)
LYMPHOCYTES ABSOLUTE: 0.91 E9/L (ref 1.5–4)
LYMPHOCYTES RELATIVE PERCENT: 9.1 % (ref 20–42)
MCH RBC QN AUTO: 27.8 PG (ref 26–35)
MCHC RBC AUTO-ENTMCNC: 31.9 % (ref 32–34.5)
MCV RBC AUTO: 86.9 FL (ref 80–99.9)
MONOCYTES ABSOLUTE: 1.59 E9/L (ref 0.1–0.95)
MONOCYTES RELATIVE PERCENT: 15.9 % (ref 2–12)
NEUTROPHILS ABSOLUTE: 7.18 E9/L (ref 1.8–7.3)
NEUTROPHILS RELATIVE PERCENT: 71.5 % (ref 43–80)
PDW BLD-RTO: 14.4 FL (ref 11.5–15)
PLATELET # BLD: 110 E9/L (ref 130–450)
PMV BLD AUTO: 13.4 FL (ref 7–12)
POTASSIUM SERPL-SCNC: 4.2 MMOL/L (ref 3.5–5)
RBC # BLD: 4.9 E12/L (ref 3.8–5.8)
SODIUM BLD-SCNC: 137 MMOL/L (ref 132–146)
TOTAL PROTEIN: 6.4 G/DL (ref 6.4–8.3)
WBC # BLD: 10 E9/L (ref 4.5–11.5)

## 2018-11-08 PROCEDURE — 2060000000 HC ICU INTERMEDIATE R&B

## 2018-11-08 PROCEDURE — 97530 THERAPEUTIC ACTIVITIES: CPT

## 2018-11-08 PROCEDURE — 6370000000 HC RX 637 (ALT 250 FOR IP): Performed by: NURSE PRACTITIONER

## 2018-11-08 PROCEDURE — 97127 HC SP THER IVNTJ W/FOCUS COG FUNCJ: CPT

## 2018-11-08 PROCEDURE — 2700000000 HC OXYGEN THERAPY PER DAY

## 2018-11-08 PROCEDURE — 6370000000 HC RX 637 (ALT 250 FOR IP): Performed by: INTERNAL MEDICINE

## 2018-11-08 PROCEDURE — 85025 COMPLETE CBC W/AUTO DIFF WBC: CPT

## 2018-11-08 PROCEDURE — 36415 COLL VENOUS BLD VENIPUNCTURE: CPT

## 2018-11-08 PROCEDURE — 2500000003 HC RX 250 WO HCPCS: Performed by: INTERNAL MEDICINE

## 2018-11-08 PROCEDURE — 97535 SELF CARE MNGMENT TRAINING: CPT

## 2018-11-08 PROCEDURE — S0028 INJECTION, FAMOTIDINE, 20 MG: HCPCS | Performed by: INTERNAL MEDICINE

## 2018-11-08 PROCEDURE — 6360000002 HC RX W HCPCS: Performed by: INTERNAL MEDICINE

## 2018-11-08 PROCEDURE — 80053 COMPREHEN METABOLIC PANEL: CPT

## 2018-11-08 PROCEDURE — 2580000003 HC RX 258: Performed by: INTERNAL MEDICINE

## 2018-11-08 RX ORDER — AMLODIPINE BESYLATE 10 MG/1
10 TABLET ORAL DAILY
Status: DISCONTINUED | OUTPATIENT
Start: 2018-11-08 | End: 2018-11-09 | Stop reason: HOSPADM

## 2018-11-08 RX ADMIN — CEFTRIAXONE SODIUM 1 G: 1 INJECTION, POWDER, FOR SOLUTION INTRAMUSCULAR; INTRAVENOUS at 14:56

## 2018-11-08 RX ADMIN — ACETAMINOPHEN 650 MG: 325 TABLET, FILM COATED ORAL at 04:46

## 2018-11-08 RX ADMIN — FAMOTIDINE 20 MG: 10 INJECTION, SOLUTION INTRAVENOUS at 08:43

## 2018-11-08 RX ADMIN — AZITHROMYCIN MONOHYDRATE 500 MG: 500 INJECTION, POWDER, LYOPHILIZED, FOR SOLUTION INTRAVENOUS at 17:23

## 2018-11-08 RX ADMIN — DEXTROSE AND SODIUM CHLORIDE: 5; 450 INJECTION, SOLUTION INTRAVENOUS at 04:47

## 2018-11-08 RX ADMIN — LISINOPRIL 20 MG: 20 TABLET ORAL at 08:43

## 2018-11-08 RX ADMIN — Medication 10 ML: at 08:43

## 2018-11-08 RX ADMIN — DEXTROSE AND SODIUM CHLORIDE: 5; 450 INJECTION, SOLUTION INTRAVENOUS at 15:31

## 2018-11-08 RX ADMIN — LEVETIRACETAM 500 MG: 100 SOLUTION ORAL at 19:58

## 2018-11-08 RX ADMIN — LABETALOL HYDROCHLORIDE 10 MG: 5 INJECTION INTRAVENOUS at 16:18

## 2018-11-08 RX ADMIN — AMLODIPINE BESYLATE 10 MG: 10 TABLET ORAL at 19:58

## 2018-11-08 RX ADMIN — HYDRALAZINE HYDROCHLORIDE 10 MG: 20 INJECTION INTRAMUSCULAR; INTRAVENOUS at 03:01

## 2018-11-08 RX ADMIN — Medication 10 ML: at 19:59

## 2018-11-08 RX ADMIN — LEVETIRACETAM 500 MG: 100 SOLUTION ORAL at 08:42

## 2018-11-08 ASSESSMENT — PAIN DESCRIPTION - LOCATION: LOCATION: GENERALIZED

## 2018-11-08 ASSESSMENT — PAIN SCALES - GENERAL
PAINLEVEL_OUTOF10: 5
PAINLEVEL_OUTOF10: 0

## 2018-11-08 ASSESSMENT — PAIN DESCRIPTION - DESCRIPTORS: DESCRIPTORS: ACHING;DISCOMFORT

## 2018-11-08 ASSESSMENT — PAIN DESCRIPTION - PAIN TYPE: TYPE: ACUTE PAIN

## 2018-11-08 ASSESSMENT — PAIN DESCRIPTION - FREQUENCY: FREQUENCY: INTERMITTENT

## 2018-11-08 ASSESSMENT — PAIN DESCRIPTION - PROGRESSION: CLINICAL_PROGRESSION: GRADUALLY WORSENING

## 2018-11-08 ASSESSMENT — PAIN DESCRIPTION - ONSET: ONSET: AWAKENED FROM SLEEP

## 2018-11-08 NOTE — PROGRESS NOTES
relevant prior studies available. FINDINGS:  Brain:  There is an intraparenchymal hemorrhage seen within the left posterior temporal parietal region measuring 1.9 cm craniocaudal dimension by 2.8 cm AP dimension by 2.4 cm transverse dimension. There is a surrounding zone of vasogenic edema seen. Subarachnoid blood is seen overlying the left frontoparietal convexity are present within the sylvian fissure on the left. A subarachnoid blood is seen overlying the right frontal parietal convexity as well. There is a small left subfalcine subdural hematoma posteriorly. There appears to be some tentorial subdural hemorrhage posteriorly as well. Focal hypoattenuation seen within the anterior limb of the internal capsule and apex of the left basal ganglia compatible with chronic lacunar infarction. Midline shift:  There is a trace amount of blood seen within the occipital horns bilaterally. Ventricles: Normal. No ventriculomegaly. Bones/joints: Normal. No acute fracture. Sinuses: Normal as visualized. No acute sinusitis. Mastoid air cells: Normal as visualized. No mastoid effusion. Soft tissues:  Soft tissue swelling and hematoma formation seen within the left posterior auricular a occipital soft tissues. 1. Left posterior temporal parietal intraparenchymal hemorrhage measuring 1.9 x 2.8 x 2.4 cm. this appears stable compared with November 3, 2018. 2. Subarachnoid blood overlies the frontoparietal convexities bilaterally are present within the left sylvian fissure. 3. Intraventricular blood seen within the dependent portions of the lateral ventricles 4. Small posterior subfalcine subdural hematoma and probable minimal posterior tentorial subdural hematomas bilaterally. 5.  There are no new acute intracranial findings.  This report has been electronically signed by Irais James MD.    Ct Head Wo Contrast    Result Date: 11/3/2018  Patient MRN:  [id number] : 3/29/1931 Age: 80 years Gender: Male Order Date: midline shift. Left parietal subgaleal hematoma. COMMUNICATION:  Communicated with: Boston Healy on 11/3/2018 at 1:35 PM.      Ct Chest Wo Contrast    Result Date: 11/3/2018  Patient MRN:  [id number] Patient :  3/29/1931 Patient Age:  80 years Patient Gender:  Male Order Date:11/3/2018 2:30 PM EXAM:  CT CHEST WO CONTRAST NUMBER OF IMAGES:  291 INDICATION:   Trauma, found down , pain COMPARISON: None. TECHNIQUE: Multiple axial images were obtained from the apices of the lungs through the lung bases. Sagittal and coronal reconstructions performed for aid in interpretation of the study. Technique: Low-dose CT  acquisition technique included one of following options; 1 . Automated exposure control, 2. Adjustment of MA and or KV according to patient's size or 3. Use of iterative reconstruction. FINDINGS: The lack of intravenous contrast limits the evaluation of mediastinal and vascular structures. LUNGS: Mild dependent atelectatic changes are seen within the lungs. No pleural effusion or pneumothorax is seen. HEART: A trace pericardial effusion is present AORTA: The aorta appears to be unremarkable. MEDIASTINUM: The mediastinum is unremarkable. UPPER ABDOMEN: Please refer to the report for the CT of the abdomen and pelvis that was performed concurrently with this exam. OTHER: A subtle cortical step-off is identified within the posterior aspect of the left 10th rib (series 2, image 41). 1. Trace pericardial effusion. 2. Nondisplaced fracture of the posterior left 10th rib of an unknown age. 3. Mild atelectatic changes of the lungs. No other acute pulmonary findings. Ct Cervical Spine Wo Contrast    Result Date: 11/3/2018  Patient MRN:  [id number] : 3/29/1931 Age: 80 years Gender: Male Order Date:  11/3/2018 12:15 PM EXAM: CT CERVICAL SPINE WO CONTRAST NUMBER OF IMAGES:  750 INDICATION:  suspected fall  , pain COMPARISON: None TECHNIQUE: Axial CT of the cervical spine was obtained.  Sagittal and coronal MPR

## 2018-11-08 NOTE — PROGRESS NOTES
Occupational Therapy  OT BEDSIDE TREATMENT NOTE      Date:2018  Patient Name: Shanelle Gibbs  MRN: [id number]  : 3/29/1931  Room: [address] [place], [place] [address]     Evaluating OT: Leighann Cosme, OTR/L [id number]     AM-PAC Daily Activity Raw Score:   G-Code 8987: CN     Recommended Adaptive Equipment: TBA: 3in1 commode, AD, bathroom DME      Diagnosis: Intracranial hemorrhage. *CT of the head revealed a left temporal lobe intracerebral hemorrhage and bilateral subdural hygromas  with scattered subarachnoid hemorrhage.  There is small posterior subfalcine subdural hematoma.     Pertinent Medical History: Dementia, AMS and fall at home     Precautions:  Falls, , ron, SBP<170     Home Living: Pt lives alone  in a duplex apt (1st floor)  with ? step(s) to enter and ? rail(s); bed/bath on first floor  Bathroom setup: pt not able to report  Equipment owned: no DME     Prior Level of Function: IND with ADLs; Assist as needed with IADLs. No device for ambulation. - history questionable-no family present. Driving: ?  Occupation: n/a     Pain Level: Denied any pain & no facial grimacing noted during treatment      Cognition: Alert & oriented x 1, unable to state his last name, when asking stating \"I don't know\" pt unable to follow commands this date, hand over hand assist, Poor processing & understanding               Memory: P              Comprehension P              Problem solving: P              Judgement/safety: P      Communication skills: impaired: garbled speech-very difficult to understand. Poor ability to communicate basic needs at this time.     Functional Assessment    Initial Eval Status  Date:  Treatment Status  Date:   18 Short Term Goals  Treatment frequency: PRN 1-3 tx/wk   Feeding NPO/NGT/DEP Max/Dep  Attempted to have hold his cup with poor results, but did take a drink with a straw when held up to his mouth              Min A after set up   while seated up in chair to increase activity tolerance once

## 2018-11-08 NOTE — PROGRESS NOTES
[facility]  Nephrology Attending Progress Note  Tami Medrano. Devon Medrano MD        SUBJECTIVE:     11/5: remains in icu. On tube feeds, no complaints  11/6: transferred out of icu, confused  11/7: remains confused, no cp or sob  11/8: remains confused      PROBLEM LIST:    Patient Active Problem List   Diagnosis    [facility] [address]    Rhabdomyolysis    DANNIE [facility]acute kidney injury) [facility] [address]    Urinary retention    Hemorrhagic stroke [facility] [address] [facility]    Palliative care encounter    Goals of care, counseling/discussion        PAST MEDICAL HISTORY:    No past medical history on file.     DIET:    DIET DYSPHAGIA I PUREED;     PHYSICAL EXAM:     Patient Vitals for the past 24 hrs:   BP Temp Temp src Pulse Resp SpO2 Weight   11/08/18 0800 (!) 151/80 98.2 °F (36.8 °C) Temporal 93 16 97 % -   11/08/18 0256 (!) 198/82 - - 90 - - 160 lb 5 oz (72.7 kg)   11/07/18 2330 (!) 185/84 98.8 °F (37.1 °C) Temporal 98 16 94 % -   11/07/18 2030 (!) 189/93 98.6 °F (37 °C) Temporal 95 18 95 % -   11/07/18 1800 (!) 198/91 - - 106 - - -   11/07/18 1600 (!) 190/89 98.6 °F (37 °C) Temporal 86 18 96 % -   @      Intake/Output Summary (Last 24 hours) at 11/08/18 1422  Last data filed at 11/08/18 0930   Gross per 24 hour   Intake             1720 ml   Output             2000 ml   Net             -280 ml         Wt Readings from Last 3 Encounters:   11/08/18 160 lb 5 oz (72.7 kg)   10/16/18 157 lb 11.2 oz (71.5 kg)       Constitutional:  Pt is in no acute distress  Head: normocephalic, atraumatic  Neck: no JVD  Cardiovascular: regular rate and rhythm, no murmurs, gallops, or rubs  Respiratory:  No rales, rhochi, or wheezes  Gastrointestinal:  Soft, nontender, nondistended, bowel sounds x 4  Ext: trace edema  Skin: dry, no rash      MEDS (scheduled):    lisinopril  20 mg Oral Daily    levETIRAcetam  500 mg Oral BID    famotidine (PEPCID) injection  20 mg Intravenous Daily    sodium chloride flush  10 mL Intravenous 2 times per day    cefTRIAXone

## 2018-11-08 NOTE — CARE COORDINATION
[facility] declined patient after being discussed with LISA. Next CLAUDIA choice is [facility]. Referral given to liaashley Jang with [facility]. They will review the case and let us know if they can accept once they speak with the family. Will continue to follow for further transition of care planning needs.      Luanne Grimes.

## 2018-11-09 VITALS
HEART RATE: 90 BPM | OXYGEN SATURATION: 96 % | WEIGHT: 152.31 LBS | DIASTOLIC BLOOD PRESSURE: 87 MMHG | HEIGHT: 72 IN | SYSTOLIC BLOOD PRESSURE: 149 MMHG | BODY MASS INDEX: 20.63 KG/M2 | TEMPERATURE: 98.8 F | RESPIRATION RATE: 18 BRPM

## 2018-11-09 LAB
ALBUMIN SERPL-MCNC: 2.8 G/DL (ref 3.5–5.2)
ALP BLD-CCNC: 77 U/L (ref 40–129)
ALT SERPL-CCNC: 26 U/L (ref 0–40)
ANION GAP SERPL CALCULATED.3IONS-SCNC: 10 MMOL/L (ref 7–16)
ANISOCYTOSIS: ABNORMAL
AST SERPL-CCNC: 20 U/L (ref 0–39)
B.E.: 2.3 MMOL/L (ref -3–3)
BASOPHILS ABSOLUTE: 0.09 E9/L (ref 0–0.2)
BASOPHILS RELATIVE PERCENT: 0.9 % (ref 0–2)
BILIRUB SERPL-MCNC: 0.9 MG/DL (ref 0–1.2)
BUN BLDV-MCNC: 15 MG/DL (ref 8–23)
BURR CELLS: ABNORMAL
CALCIUM SERPL-MCNC: 9.4 MG/DL (ref 8.6–10.2)
CHLORIDE BLD-SCNC: 101 MMOL/L (ref 98–107)
CO2: 24 MMOL/L (ref 22–29)
COHB: 1 % (ref 0–1.5)
CREAT SERPL-MCNC: 0.9 MG/DL (ref 0.7–1.2)
CRITICAL: ABNORMAL
DATE ANALYZED: ABNORMAL
DATE OF COLLECTION: ABNORMAL
EOSINOPHILS ABSOLUTE: 0.25 E9/L (ref 0.05–0.5)
EOSINOPHILS RELATIVE PERCENT: 2.6 % (ref 0–6)
GFR AFRICAN AMERICAN: >60
GFR NON-AFRICAN AMERICAN: >60 ML/MIN/1.73
GLUCOSE BLD-MCNC: 160 MG/DL (ref 74–99)
HCO3: 25.4 MMOL/L (ref 22–26)
HCT VFR BLD CALC: 39.1 % (ref 37–54)
HEMOGLOBIN: 12.5 G/DL (ref 12.5–16.5)
HHB: 3.5 % (ref 0–5)
LAB: 9558
LYMPHOCYTES ABSOLUTE: 0.97 E9/L (ref 1.5–4)
LYMPHOCYTES RELATIVE PERCENT: 10.4 % (ref 20–42)
Lab: ABNORMAL
MCH RBC QN AUTO: 27.7 PG (ref 26–35)
MCHC RBC AUTO-ENTMCNC: 32 % (ref 32–34.5)
MCV RBC AUTO: 86.7 FL (ref 80–99.9)
METHB: 0.4 % (ref 0–1.5)
MODE: ABNORMAL
MONOCYTES ABSOLUTE: 1.36 E9/L (ref 0.1–0.95)
MONOCYTES RELATIVE PERCENT: 13.9 % (ref 2–12)
NEUTROPHILS ABSOLUTE: 6.98 E9/L (ref 1.8–7.3)
NEUTROPHILS RELATIVE PERCENT: 72.2 % (ref 43–80)
O2 CONTENT: 18.6 ML/DL
O2 SATURATION: 96.5 % (ref 92–98.5)
O2HB: 95.1 % (ref 94–97)
OPERATOR ID: 1064
OVALOCYTES: ABNORMAL
PATIENT TEMP: 37 C
PCO2: 34.8 MMHG (ref 35–45)
PDW BLD-RTO: 14.5 FL (ref 11.5–15)
PH BLOOD GAS: 7.48 (ref 7.35–7.45)
PLATELET # BLD: 128 E9/L (ref 130–450)
PMV BLD AUTO: 12.6 FL (ref 7–12)
PO2: 74.8 MMHG (ref 60–100)
POIKILOCYTES: ABNORMAL
POTASSIUM SERPL-SCNC: 4 MMOL/L (ref 3.5–5)
RBC # BLD: 4.51 E12/L (ref 3.8–5.8)
SODIUM BLD-SCNC: 135 MMOL/L (ref 132–146)
SOURCE, BLOOD GAS: ABNORMAL
THB: 13.9 G/DL (ref 11.5–16.5)
TIME ANALYZED: 1332
TOTAL PROTEIN: 6.3 G/DL (ref 6.4–8.3)
WBC # BLD: 9.7 E9/L (ref 4.5–11.5)

## 2018-11-09 PROCEDURE — 85025 COMPLETE CBC W/AUTO DIFF WBC: CPT

## 2018-11-09 PROCEDURE — 82805 BLOOD GASES W/O2 SATURATION: CPT

## 2018-11-09 PROCEDURE — 36600 WITHDRAWAL OF ARTERIAL BLOOD: CPT

## 2018-11-09 PROCEDURE — 6370000000 HC RX 637 (ALT 250 FOR IP): Performed by: NURSE PRACTITIONER

## 2018-11-09 PROCEDURE — 2700000000 HC OXYGEN THERAPY PER DAY

## 2018-11-09 PROCEDURE — 97535 SELF CARE MNGMENT TRAINING: CPT

## 2018-11-09 PROCEDURE — 80053 COMPREHEN METABOLIC PANEL: CPT

## 2018-11-09 PROCEDURE — S0028 INJECTION, FAMOTIDINE, 20 MG: HCPCS | Performed by: INTERNAL MEDICINE

## 2018-11-09 PROCEDURE — 2580000003 HC RX 258: Performed by: INTERNAL MEDICINE

## 2018-11-09 PROCEDURE — 2500000003 HC RX 250 WO HCPCS: Performed by: INTERNAL MEDICINE

## 2018-11-09 PROCEDURE — 97530 THERAPEUTIC ACTIVITIES: CPT

## 2018-11-09 PROCEDURE — 92507 TX SP LANG VOICE COMM INDIV: CPT

## 2018-11-09 PROCEDURE — 6370000000 HC RX 637 (ALT 250 FOR IP): Performed by: INTERNAL MEDICINE

## 2018-11-09 PROCEDURE — 36415 COLL VENOUS BLD VENIPUNCTURE: CPT

## 2018-11-09 RX ORDER — AMLODIPINE BESYLATE 10 MG/1
10 TABLET ORAL DAILY
Qty: 30 TABLET | Refills: 3 | Status: SHIPPED | OUTPATIENT
Start: 2018-11-10

## 2018-11-09 RX ORDER — LEVETIRACETAM 100 MG/ML
500 SOLUTION ORAL 2 TIMES DAILY
Qty: 120 ML | Refills: 3 | Status: SHIPPED | OUTPATIENT
Start: 2018-11-09

## 2018-11-09 RX ADMIN — LEVETIRACETAM 500 MG: 100 SOLUTION ORAL at 09:18

## 2018-11-09 RX ADMIN — Medication 10 ML: at 09:30

## 2018-11-09 RX ADMIN — LISINOPRIL 20 MG: 20 TABLET ORAL at 09:18

## 2018-11-09 RX ADMIN — AMLODIPINE BESYLATE 10 MG: 10 TABLET ORAL at 09:18

## 2018-11-09 RX ADMIN — FAMOTIDINE 20 MG: 10 INJECTION, SOLUTION INTRAVENOUS at 09:19

## 2018-11-09 NOTE — PROGRESS NOTES
Pt seen for cognitive linguistic deficits. Difficulty to awaken. Did not follow commands or respond to questions. Orientation provided.   Will continue next week

## 2018-11-09 NOTE — DISCHARGE SUMMARY
underlying cortices of the frontal lobe and the parietal lobes secondary to the chronic subdural hematoma. There is no midline shift or effacement of the basal cisterns. Chronic change:   Scattered patchy foci of low attenuation are present within supratentorial white matter which is a nonspecific finding but likely represents mild microvascular ischemia. Remote lacunar infarct in the left basal ganglia. Ventricles:   No hydrocephalus. Blood products are seen in dependent portions of both lateral ventricles. Paranasal sinuses and skull base:  Opacification of a few ethmoid air cells. The maxillary sinuses unremarkable. Mastoid air cells are clear. There is a large hematoma overlying the right parietal skull. This is somewhat age indeterminate. Intraparenchymal hematoma in the left temporal lobe with scattered subarachnoid hemorrhage in the bilateral parietal and frontal lobes. There is additionally acute intraventricular blood product. More chronic appearing subdural hygromas/resolving hematomas overlie the frontal convexities bilaterally. There is mild associated mass effect. No midline shift. Left parietal subgaleal hematoma. COMMUNICATION:  Communicated with: Lee Ann Garcia on 11/3/2018 at 1:35 PM.      Ct Chest Wo Contrast    Result Date: 11/3/2018  Patient MRN:  [id number] Patient :  3/29/1931 Patient Age:  80 years Patient Gender:  Male Order Date:11/3/2018 2:30 PM EXAM:  CT CHEST WO CONTRAST NUMBER OF IMAGES:  291 INDICATION:   Trauma, found down , pain COMPARISON: None. TECHNIQUE: Multiple axial images were obtained from the apices of the lungs through the lung bases. Sagittal and coronal reconstructions performed for aid in interpretation of the study. Technique: Low-dose CT  acquisition technique included one of following options; 1 . Automated exposure control, 2. Adjustment of MA and or KV according to patient's size or 3. Use of iterative reconstruction.  FINDINGS: The lack of intravenous

## 2018-11-09 NOTE — PROGRESS NOTES
Physical Therapy  Facility/Department: [facility]  Daily Treatment Note  NAME: Ariel Dorsey  : 3/29/1931  MRN: [id number]    Date of Service: 2018  Evaluating Therapist: Valentino Seek, PT, DPT     ROOM #: [id number]   DIAGNOSIS: ICH  PRECAUTIONS: Falls, L temporal ICH, B SAHs in parietal/frontal lobes, NG tube,   TSM, bed alarm    PROCEDURES: NA     Social:  Pt lives alone in a duplex apartment, 1st floor setup - per chart. Prior to admission pt walked with no device and was Independent - per chart.                       Initial Evaluation  Date: 18 Treatment  Date: 18 Short Term/ Long Term   Goals   AM-PAC 6 Clicks      Does pt have pain? Pt reported \"mild pain\" but unable to locate No current complaints of pain     Bed Mobility  Rolling: NT  Supine to sit: Dep x 2  Sit to supine: Dep x 2  Scooting: Dep Rolling: Max A L and R   Supine to sit: Dep   Sit to supine: max A + 2  Scooting: max  + 2 toward HOB Nita   Transfers Sit to stand: ModA  Stand to sit: ModA  Stand pivot: NT Sit to stand: Max A x2  Stand to sit: Max A x2  Stand pivot: NT Nita with AAD   Ambulation   2 steps to [employer] 2 shuffling side steps along EOB dep +2 >50 feet with Nita with AAD   Stair negotiation: ascended and descended NT NT >4 steps with 1 rail with Nita       Balance: seated EOB SB/CGA static    Patient education  Pt was educated on safety / technique w/ functional mobility     Patient response to education:   Pt verbalized understanding Pt demonstrated skill Pt requires further education in this area   unable Unable  x     Additional Comments: pt alert. Primarily non verbal throughout the session. Speech is garbled when he attempts to speak  Pt unable to follow simple commands , and was resistive at times . Pt sat EOB x 15 mins , emphasis on static sit balance and core stabilization. Pt performed sit <> stand x 3 reps max +2 w/ LE blocked. Noted bed lightly soiled.  A pt back to supine and

## 2018-11-09 NOTE — PROGRESS NOTES
Nutrition Assessment    Type and Reason for Visit: Reassess    Nutrition Recommendations: Continue current diet, Start Magic Cup BID    Nutrition Assessment: pt no longer needing EN, eating well w/ assistance    Malnutrition Assessment:  · Malnutrition Status: At risk for malnutrition  · Context: Acute illness or injury  · Findings of the 6 clinical characteristics of malnutrition (Minimum of 2 out of 6 clinical characteristics is required to make the diagnosis of moderate or severe Protein Calorie Malnutrition based on AND/ASPEN Guidelines):  1. Energy Intake-Greater than 75%, greater than or equal to 5 days    2. Weight Loss-2% loss or greater, in 1 month  3. Fat Loss-No significant subcutaneous fat loss    4. Muscle Loss-No significant muscle mass loss    5. Fluid Accumulation-No significant fluid accumulation    6.   Strength-Not measured    Nutrition Risk Level: Low    Nutrient Needs:  · Estimated Daily Total Kcal:  (MSJ 1410 x 1.2 SF)  · Estimated Daily Protein (g): 70-85  · Estimated Daily Total Fluid (ml/day):     Nutrition Diagnosis:   · Problem: No nutrition diagnosis at this time    Objective Information:  · Nutrition-Focused Physical Findings: continued disorientation, edentulous, soft abd, active BS, -I/Os, no noted edema  · Wound Type: None  · Current Nutrition Therapies:  · Oral Diet Orders: Dysphagia 1 (Pureed)   · Oral Diet intake: % (per doc flow)  · Oral Nutrition Supplement (ONS) Orders: None  · Anthropometric Measures:  · Ht: 6' (182.9 cm)   · Current Body Wt: 152 lb (68.9 kg) (bed scale 11/9)  · Admission Body Wt: 149 lb (67.6 kg) (first measured 11/3)  · Usual Body Wt: 157 lb (71.2 kg) (actual per EMR 10/2018)  · Weight Change: noted wt fluctuations during adm likely r/t fluids, CBW reflects 3% wt loss x1 month  · Ideal Body Wt: 178 lb (80.7 kg), % Ideal Body 85%  · BMI Classification: BMI 18.5 - 24.9 Normal Weight    Nutrition Interventions:   Continued Inpatient

## 2018-11-09 NOTE — PROGRESS NOTES
IV  1 g Intravenous Q24H    azithromycin  500 mg Intravenous Q24H    pneumococcal 13-valent conjugate  0.5 mL Intramuscular Prior to discharge    influenza virus vaccine  0.5 mL Intramuscular Prior to discharge       MEDS (infusions):   dextrose 5 % and 0.45 % NaCl 100 mL/hr at 11/08/18 1531       MEDS (prn):  haloperidol lactate, hydrALAZINE, labetalol, sodium chloride flush, magnesium hydroxide, ondansetron, acetaminophen, acetaminophen    DATA:    Recent Labs      11/07/18   0450 11/08/18   0450  11/09/18   0401   WBC  8.9  10.0  9.7   HGB  12.9  13.6  12.5   HCT  41.0  42.6  39.1   MCV  87.8  86.9  86.7   PLT  84*  110*  128*     Recent Labs      11/07/18   0450  11/08/18   0450  11/09/18   0401   NA  137  137  135   K  4.2  4.2  4.0   CL  102  104  101   CO2  24  22  24   BUN  12  16  15   CREATININE  0.8  0.8  0.9   LABGLOM  >60  >60  >60   GLUCOSE  161*  162*  160*   CALCIUM  9.3  9.6  9.4   ALT  27  37  26   AST  26  31  20   BILITOT  1.2  1.3*  0.9   ALKPHOS  68  79  77   MG  2.1   --    --    PHOS  2.1*   --    --        Lab Results   Component Value Date    LABALBU 2.8 (L) 11/09/2018    LABALBU 2.9 (L) 11/08/2018    LABALBU 2.8 (L) 11/07/2018     Lab Results   Component Value Date    TSH 1.950 11/03/2018     No results found for: PHART, PO2ART, RMD0BSI    Iron Studies: No results found for: IRON, TIBC, FERRITIN     Lab Results   Component Value Date    CKTOTAL 585 (H) 11/05/2018          IMPRESSION/RECOMMENDATIONS:      1. arf  Resolved. Cr 0.9  Prerenal azotemia resolved with ivf    2. ICH/cva    3. rhabdo  improved    4. Hypophosphatemia  Replace prn    5. htn  In setting of ICH  Started his outpt lisinopril   Added norvasc    Corina Christian.  Kymberly Herndon MD